# Patient Record
Sex: MALE | Race: BLACK OR AFRICAN AMERICAN | NOT HISPANIC OR LATINO | Employment: FULL TIME | URBAN - METROPOLITAN AREA
[De-identification: names, ages, dates, MRNs, and addresses within clinical notes are randomized per-mention and may not be internally consistent; named-entity substitution may affect disease eponyms.]

---

## 2017-01-10 ENCOUNTER — HOSPITAL ENCOUNTER (EMERGENCY)
Facility: HOSPITAL | Age: 52
Discharge: HOME | End: 2017-01-10
Payer: COMMERCIAL

## 2017-01-10 VITALS
HEART RATE: 98 BPM | WEIGHT: 213 LBS | HEIGHT: 77 IN | TEMPERATURE: 98 F | DIASTOLIC BLOOD PRESSURE: 83 MMHG | OXYGEN SATURATION: 99 % | RESPIRATION RATE: 18 BRPM | BODY MASS INDEX: 25.15 KG/M2 | SYSTOLIC BLOOD PRESSURE: 143 MMHG

## 2017-01-10 DIAGNOSIS — M54.50 CHRONIC RIGHT-SIDED LOW BACK PAIN WITHOUT SCIATICA: Primary | ICD-10-CM

## 2017-01-10 DIAGNOSIS — G89.29 CHRONIC RIGHT-SIDED LOW BACK PAIN WITHOUT SCIATICA: Primary | ICD-10-CM

## 2017-01-10 PROCEDURE — 99283 EMERGENCY DEPT VISIT LOW MDM: CPT

## 2017-01-10 RX ORDER — METHOCARBAMOL 750 MG/1
1500 TABLET, FILM COATED ORAL 3 TIMES DAILY
Qty: 15 TABLET | Refills: 0 | Status: SHIPPED | OUTPATIENT
Start: 2017-01-10 | End: 2017-01-15

## 2017-01-10 RX ORDER — DICLOFENAC SODIUM 25 MG/1
25 TABLET, DELAYED RELEASE ORAL 3 TIMES DAILY
Qty: 30 TABLET | Refills: 0 | Status: SHIPPED | OUTPATIENT
Start: 2017-01-10 | End: 2017-07-24

## 2017-01-10 NOTE — ED NOTES
"Pt reports having previous back injury while lifting weights. This episode was not from an injury, pt states, " sometimes it just gets irritated from bending over to tie my shoe."   "

## 2017-01-10 NOTE — ED PROVIDER NOTES
Encounter Date: 1/10/2017       History     Chief Complaint   Patient presents with    Back Pain     Hx of back pain. R lower back pain. Onset of episode yesterday. Worsening of pain since yesterday.      Review of patient's allergies indicates:  No Known Allergies  HPI Comments: Pt reports to ED c/o back pain. Pt reports pain is to right lower back and is similar to previous back pain.  Pt says he was helping his son put tires on a car yesterday and his back has been hurting him since then. Pt reports that pain initially radiated down his right leg, but now the pain is only in his back.  No alleviating factors. Pain worse with bending over.  Pt denies any paraesthesia, saddle anesthesia, CP, SOB, fever, nausea, vomiting, diarrhea, or any other associated sxs.     The history is provided by the patient.     Past Medical History   Diagnosis Date    Medical history non-contributory      No past medical history pertinent negatives.  Past Surgical History   Procedure Laterality Date    Knee surgery Left      Family History   Problem Relation Age of Onset    Hypertension       Social History   Substance Use Topics    Smoking status: Never Smoker    Smokeless tobacco: None    Alcohol use No     Review of Systems   Constitutional: Negative for fever.   HENT: Negative for sore throat.    Respiratory: Negative for shortness of breath.    Cardiovascular: Negative for chest pain.   Gastrointestinal: Negative for nausea.   Genitourinary: Negative for dysuria.   Musculoskeletal: Positive for back pain.   Skin: Negative for rash.   Neurological: Negative for weakness.   Hematological: Does not bruise/bleed easily.   All other systems reviewed and are negative.      Physical Exam   Initial Vitals   BP Pulse Resp Temp SpO2   01/10/17 1421 01/10/17 1421 01/10/17 1421 01/10/17 1421 01/10/17 1421   143/83 98 18 98 °F (36.7 °C) 99 %     Physical Exam    Nursing note and vitals reviewed.  Constitutional: He appears well-developed  and well-nourished.   HENT:   Head: Normocephalic and atraumatic.   Eyes: Conjunctivae and EOM are normal. Pupils are equal, round, and reactive to light.   Neck: Normal range of motion. Neck supple.   Cardiovascular: Normal rate and regular rhythm.   Pulmonary/Chest: Breath sounds normal.   Abdominal: Soft. Bowel sounds are normal.   Musculoskeletal: He exhibits tenderness (TTP right lower back.  Decreased ROM with bending forward d/t pain). He exhibits no edema.   Neurological: He is alert and oriented to person, place, and time. He has normal strength and normal reflexes. No cranial nerve deficit or sensory deficit.   Skin: No rash and no abscess noted. No erythema.         ED Course   Procedures  Labs Reviewed - No data to display                            ED Course     Clinical Impression:   The encounter diagnosis was Chronic right-sided low back pain without sciatica.          KOKI Acuna  01/10/17 1454

## 2017-01-10 NOTE — ED AVS SNAPSHOT
[facility]  [address] [address] [address]  [facility] [address]               Braulio Copeland   1/10/2017  2:24 PM   ED    Description:  Male : 1965   Department:  [facility]           Your Care was Coordinated By:     Provider Role From To    KOKI Acuna Physician Assistant 01/10/17 1423 --      Reason for Visit     Back Pain           Diagnoses this Visit        Comments    Chronic right-sided low back pain without sciatica    -  Primary       ED Disposition     None           To Do List           Follow-up Information     Follow up with Anam Andrews MD In 2 days.    Specialty:  Family Medicine    Why:  As needed, If symptoms worsen return to ED     Contact information:    [address] [place] [address]  [facility] [id number] [phone]         These Medications        Disp Refills Start End    methocarbamol (ROBAXIN) 750 MG Tab 15 tablet 0 1/10/2017 1/15/2017    Take 2 tablets (1,500 mg total) by mouth 3 (three) times daily. - Oral    diclofenac (VOLTAREN) 25 MG TbEC 30 tablet 0 1/10/2017     Take 1 tablet (25 mg total) by mouth 3 (three) times daily. - Oral      [facility]     [facility] -  Assistance  Registered nurses in the [facility] provide clinical advisement, health education, appointment booking, and other advisory services.  Call for this free service at [phone].             Medications           Message regarding Medications     Verify the changes and/or additions to your medication regime listed below are the same as discussed with your clinician today.  If any of these changes or additions are incorrect, please notify your healthcare provider.        START taking these NEW medications        Refills    methocarbamol (ROBAXIN) 750 MG Tab 0    Sig: Take 2 tablets (1,500 mg total) by mouth 3 (three) times daily.    Class: Print    Route: Oral    diclofenac (VOLTAREN) 25 MG TbEC 0    Sig: Take 1 tablet  "(25 mg total) by mouth 3 (three) times daily.    Class: Print    Route: Oral      STOP taking these medications     naproxen (NAPROSYN) 500 MG tablet Take 1 tablet (500 mg total) by mouth 2 (two) times daily with meals.    methylPREDNISolone (MEDROL DOSEPACK) 4 mg tablet Follow directions on pkg    hydrocodone-acetaminophen 10-325mg (NORCO)  mg Tab Take 1 tablet by mouth every 4 (four) hours as needed.    hydrocodone-acetaminophen 10-325mg (NORCO)  mg Tab Take 1 tablet by mouth every 6 (six) hours as needed.           Verify that the below list of medications is an accurate representation of the medications you are currently taking.  If none reported, the list may be blank. If incorrect, please contact your healthcare provider. Carry this list with you in case of emergency.           Current Medications     diclofenac (VOLTAREN) 25 MG TbEC Take 1 tablet (25 mg total) by mouth 3 (three) times daily.    methocarbamol (ROBAXIN) 750 MG Tab Take 2 tablets (1,500 mg total) by mouth 3 (three) times daily.           Clinical Reference Information           Your Vitals Were     BP Pulse Temp Resp Height Weight    143/83 (BP Location: Left arm, Patient Position: Sitting) 98 98 °F (36.7 °C) (Oral) 18 6' 5" (1.956 m) 96.6 kg (213 lb)    SpO2 BMI             99% 25.26 kg/m2         Allergies as of 1/10/2017     No Known Allergies      Immunizations Administered on Date of Encounter - 1/10/2017     None      ED Micro, Lab, POCT     None      ED Imaging Orders     None        Discharge Instructions         Back Care Tips    Caring for your back  These are things you can do to prevent a recurrence of acute back pain and to reduce symptoms from chronic back pain:  · Maintain a healthy weight. If you are overweight, losing weight will help most types of back pain.  · Exercise is an important part of recovery from most types of back pain. The muscles behind and in front of the spine support the back. This means " strengthening both the back muscles and the abdominal muscles will provide better support for your spine.   · Swimming and brisk walking are good overall exercises to improve your fitness level.  · Practice safe lifting methods (below).  · Practice good posture when sitting, standing and walking. Avoid prolonged sitting. This puts more stress on the lower back than standing or walking.  · Wear quality shoes with sufficient arch support. Foot and ankle alignment can affect back symptoms. Women should avoid wearing high heels.  · Therapeutic massage can help relax the back muscles without stretching them.  · During the first 24 to 72 hours after an acute injury or flare-up of chronic back pain, apply an ice pack to the painful area for 20 minutes and then remove it for 20 minutes, over a period of 60 to 90 minutes, or several times a day. As a safety precaution, do not use a heating pad at bedtime. Sleeping on a heating pad can lead to skin burns or tissue damage.  · You can alternate ice and heat therapies.  Medications  Talk to your healthcare provider before using medicines, especially if you have other medical problems or are taking other medicines.  · You may use acetaminophen or ibuprofen to control pain, unless your healthcare provider prescribed other pain medicine. If you have chronic conditions like diabetes, liver or kidney disease, stomach ulcers, or gastrointestinal bleeding, or are taking blood thinners, talk with your healthcare provider before taking any medicines.  · Be careful if you are given prescription pain medicines, narcotics, or medicine for muscle spasm. They can cause drowsiness, affect your coordination, reflexes, and judgment. Do not drive or operate heavy machinery while taking these types of medicines. Take prescription pain medicine only as prescribed by your healthcare provider.  Lumbar stretch  Here is a simple stretching exercise that will help relax muscle spasm and keep your back  more limber. If exercise makes your back pain worse, dont do it.  · Lie on your back with your knees bent and both feet on the ground.  · Slowly raise your left knee to your chest as you flatten your lower back against the floor. Hold for 5 seconds.  · Relax and repeat the exercise with your right knee.  · Do 10 of these exercises for each leg.  Safe lifting method  · Dont bend over at the waist to lift an object off the floor.  Instead, bend your knees and hips in a squat.   · Keep your back and head upright  · Hold the object close to your body, directly in front of you.  · Straighten your legs to lift the object.   · Lower the object to the floor in the reverse fashion.  · If you must slide something across the floor, push it.  Posture tips  Sitting  Sit in chairs with straight backs or low-back support. Keep your knees lower than your hips, with your feet flat on the floor.  When driving, sit up straight. Adjust the seat forward so you are not leaning toward the steering wheel.  A small pillow or rolled towel behind your lower back may help if you are driving long distances.   Standing  When standing for long periods, shift most of your weight to one leg at a time. Alternate legs every few minutes.   Sleeping  The best way to sleep is on your side with your knees bent. Put a low pillow under your head to support your neck in a neutral spine position. Avoid thick pillows that bend your neck to one side. Put a pillow between your legs to further relax your lower back. If you sleep on your back, put pillows under your knees to support your legs in a slightly flexed position. Use a firm mattress. If your mattress sags, replace it, or use a 1/2-inch plywood board under the mattress to add support.  Follow-up care  Follow up with your healthcare provider, or as advised.  If X-rays, a CT scan or an MRI scan were taken, they will be reviewed by a radiologist. You will be notified of any new findings that may affect  your care.  Call 911  Seek emergency medical care if any of the following occur:  · Trouble breathing  · Confusion  · Very drowsy  · Fainting or loss of consciousness  · Rapid or very slow heart rate  · Loss of  bowel or bladder control  When to seek medical care  Call your healthcare provider if any of the following occur:  · Pain becomes worse or spreads to your arms or legs  · Weakness or numbness in one or both arms or legs  · Numbness in the groin area  © 1558-0262 [employer] [address] [place] [address]. All rights reserved. This information is not intended as a substitute for professional medical care. Always follow your healthcare professional's instructions.          [facility] Sign-Up     Activating your [facility] account is as easy as 1-2-3!     1) Visit [email or web address], select Sign Up Now, enter this activation code and your date of birth, then select Next.  [email or web address]  Expires: 2/24/2017  2:49 PM      2) Create a username and password to use when you visit [facility] in the future and select a security question in case you lose your password and select Next.    3) Enter your e-mail address and click Sign Up!    Additional Information  If you have questions, please e-mail [email or web address] or call [phone] to talk to our [facility] staff. Remember, [facility] is NOT to be used for urgent needs. For medical emergencies, dial 911.          [facility] complies with applicable Federal civil rights laws and does not discriminate on the basis of race, color, national origin, age, disability, or sex.        Language Assistance Services     ATTENTION: Language assistance services are available, free of charge. Please call [phone].      ATENCIÓN: Si habla español, tiene a guo disposición servicios gratuitos de asistencia lingüística. Llame al [phone].     CHÚ Ý: N?u b?n nói Ti?ng Vi?t, có các d?ch v? h? tr? ngôn ng? mi?n phí dành cho b?n. G?i  s? [phone].

## 2017-01-10 NOTE — DISCHARGE INSTRUCTIONS
Back Care Tips    Caring for your back  These are things you can do to prevent a recurrence of acute back pain and to reduce symptoms from chronic back pain:  · Maintain a healthy weight. If you are overweight, losing weight will help most types of back pain.  · Exercise is an important part of recovery from most types of back pain. The muscles behind and in front of the spine support the back. This means strengthening both the back muscles and the abdominal muscles will provide better support for your spine.   · Swimming and brisk walking are good overall exercises to improve your fitness level.  · Practice safe lifting methods (below).  · Practice good posture when sitting, standing and walking. Avoid prolonged sitting. This puts more stress on the lower back than standing or walking.  · Wear quality shoes with sufficient arch support. Foot and ankle alignment can affect back symptoms. Women should avoid wearing high heels.  · Therapeutic massage can help relax the back muscles without stretching them.  · During the first 24 to 72 hours after an acute injury or flare-up of chronic back pain, apply an ice pack to the painful area for 20 minutes and then remove it for 20 minutes, over a period of 60 to 90 minutes, or several times a day. As a safety precaution, do not use a heating pad at bedtime. Sleeping on a heating pad can lead to skin burns or tissue damage.  · You can alternate ice and heat therapies.  Medications  Talk to your healthcare provider before using medicines, especially if you have other medical problems or are taking other medicines.  · You may use acetaminophen or ibuprofen to control pain, unless your healthcare provider prescribed other pain medicine. If you have chronic conditions like diabetes, liver or kidney disease, stomach ulcers, or gastrointestinal bleeding, or are taking blood thinners, talk with your healthcare provider before taking any medicines.  · Be careful if you are given  prescription pain medicines, narcotics, or medicine for muscle spasm. They can cause drowsiness, affect your coordination, reflexes, and judgment. Do not drive or operate heavy machinery while taking these types of medicines. Take prescription pain medicine only as prescribed by your healthcare provider.  Lumbar stretch  Here is a simple stretching exercise that will help relax muscle spasm and keep your back more limber. If exercise makes your back pain worse, dont do it.  · Lie on your back with your knees bent and both feet on the ground.  · Slowly raise your left knee to your chest as you flatten your lower back against the floor. Hold for 5 seconds.  · Relax and repeat the exercise with your right knee.  · Do 10 of these exercises for each leg.  Safe lifting method  · Dont bend over at the waist to lift an object off the floor.  Instead, bend your knees and hips in a squat.   · Keep your back and head upright  · Hold the object close to your body, directly in front of you.  · Straighten your legs to lift the object.   · Lower the object to the floor in the reverse fashion.  · If you must slide something across the floor, push it.  Posture tips  Sitting  Sit in chairs with straight backs or low-back support. Keep your knees lower than your hips, with your feet flat on the floor.  When driving, sit up straight. Adjust the seat forward so you are not leaning toward the steering wheel.  A small pillow or rolled towel behind your lower back may help if you are driving long distances.   Standing  When standing for long periods, shift most of your weight to one leg at a time. Alternate legs every few minutes.   Sleeping  The best way to sleep is on your side with your knees bent. Put a low pillow under your head to support your neck in a neutral spine position. Avoid thick pillows that bend your neck to one side. Put a pillow between your legs to further relax your lower back. If you sleep on your back, put pillows  under your knees to support your legs in a slightly flexed position. Use a firm mattress. If your mattress sags, replace it, or use a 1/2-inch plywood board under the mattress to add support.  Follow-up care  Follow up with your healthcare provider, or as advised.  If X-rays, a CT scan or an MRI scan were taken, they will be reviewed by a radiologist. You will be notified of any new findings that may affect your care.  Call 911  Seek emergency medical care if any of the following occur:  · Trouble breathing  · Confusion  · Very drowsy  · Fainting or loss of consciousness  · Rapid or very slow heart rate  · Loss of  bowel or bladder control  When to seek medical care  Call your healthcare provider if any of the following occur:  · Pain becomes worse or spreads to your arms or legs  · Weakness or numbness in one or both arms or legs  · Numbness in the groin area  © 7169-2465 [employer] [address] [place] [address], [place] [id number]. All rights reserved. This information is not intended as a substitute for professional medical care. Always follow your healthcare professional's instructions.

## 2017-07-24 ENCOUNTER — HOSPITAL ENCOUNTER (EMERGENCY)
Facility: HOSPITAL | Age: 52
Discharge: HOME | End: 2017-07-24
Attending: EMERGENCY MEDICINE
Payer: COMMERCIAL

## 2017-07-24 VITALS
TEMPERATURE: 98 F | SYSTOLIC BLOOD PRESSURE: 135 MMHG | HEIGHT: 76 IN | WEIGHT: 262 LBS | RESPIRATION RATE: 20 BRPM | DIASTOLIC BLOOD PRESSURE: 89 MMHG | HEART RATE: 85 BPM | OXYGEN SATURATION: 97 % | BODY MASS INDEX: 31.9 KG/M2

## 2017-07-24 DIAGNOSIS — S83.402A SPRAIN OF COLLATERAL LIGAMENT OF LEFT KNEE, INITIAL ENCOUNTER: Primary | ICD-10-CM

## 2017-07-24 DIAGNOSIS — M25.562 LEFT KNEE PAIN: ICD-10-CM

## 2017-07-24 PROCEDURE — 29505 APPLICATION LONG LEG SPLINT: CPT | Mod: LT

## 2017-07-24 PROCEDURE — 25000003 PHARM REV CODE 250: Performed by: NURSE PRACTITIONER

## 2017-07-24 PROCEDURE — 99283 EMERGENCY DEPT VISIT LOW MDM: CPT | Mod: 25

## 2017-07-24 RX ORDER — HYDROCODONE BITARTRATE AND ACETAMINOPHEN 10; 325 MG/1; MG/1
1 TABLET ORAL
Status: COMPLETED | OUTPATIENT
Start: 2017-07-24 | End: 2017-07-24

## 2017-07-24 RX ORDER — MELOXICAM 7.5 MG/1
7.5 TABLET ORAL 2 TIMES DAILY
Qty: 30 TABLET | Refills: 0 | Status: SHIPPED | OUTPATIENT
Start: 2017-07-24 | End: 2017-12-07 | Stop reason: ALTCHOICE

## 2017-07-24 RX ORDER — KETOROLAC TROMETHAMINE 10 MG/1
10 TABLET, FILM COATED ORAL
Status: COMPLETED | OUTPATIENT
Start: 2017-07-24 | End: 2017-07-24

## 2017-07-24 RX ORDER — HYDROCODONE BITARTRATE AND ACETAMINOPHEN 7.5; 325 MG/1; MG/1
1 TABLET ORAL EVERY 6 HOURS PRN
Qty: 15 TABLET | Refills: 0 | Status: SHIPPED | OUTPATIENT
Start: 2017-07-24 | End: 2017-12-07 | Stop reason: ALTCHOICE

## 2017-07-24 RX ADMIN — KETOROLAC TROMETHAMINE 10 MG: 10 TABLET, FILM COATED ORAL at 05:07

## 2017-07-24 RX ADMIN — HYDROCODONE BITARTRATE AND ACETAMINOPHEN 1 TABLET: 10; 325 TABLET ORAL at 06:07

## 2017-07-24 NOTE — ED PROVIDER NOTES
Encounter Date: 7/24/2017       History     Chief Complaint   Patient presents with    Knee Injury     c/o left knee pain and swelling after slipping in yard yesterday     The history is provided by the patient.   Leg Pain    The incident occurred in the yard. The injury mechanism was a fall (patient states that he hurt his left knee after slipping in his yard yesterday). The incident occurred yesterday. The pain is present in the left knee. The quality of the pain is described as aching. The pain is at a severity of 10/10. The pain has been constant since onset. Associated symptoms include inability to bear weight (secondary to pain). Pertinent negatives include no numbness, no loss of motion, no muscle weakness, no loss of sensation and no tingling. The symptoms are aggravated by activity, bearing weight and palpation. He has tried rest, ice and NSAIDs for the symptoms. The treatment provided no relief.       PCP:   Anam Andrews MD        Review of patient's allergies indicates:  No Known Allergies  Past Medical History:   Diagnosis Date    Medical history non-contributory      Past Surgical History:   Procedure Laterality Date    KNEE SURGERY Left      Family History   Problem Relation Age of Onset    Hypertension       Social History   Substance Use Topics    Smoking status: Never Smoker    Smokeless tobacco: Never Used    Alcohol use No     Review of Systems   Constitutional: Negative for chills and fever.   HENT: Negative for congestion and sore throat.    Respiratory: Negative for chest tightness and shortness of breath.    Cardiovascular: Negative for chest pain.   Gastrointestinal: Negative for abdominal pain, diarrhea, nausea and vomiting.   Genitourinary: Negative for dysuria.   Musculoskeletal: Negative for back pain and neck pain.        Positive for pain to left knee.    Skin: Negative for rash and wound.   Neurological: Negative for dizziness, tingling, weakness and numbness.    Hematological: Does not bruise/bleed easily.       Physical Exam     Initial Vitals [07/24/17 1616]   BP Pulse Resp Temp SpO2   (!) 157/90 88 18 97.5 °F (36.4 °C) 99 %      MAP       112.33         Physical Exam    Nursing note and vitals reviewed.  Constitutional: He appears well-developed and well-nourished. He is Obese . He is cooperative. He does not appear ill. No distress.   HENT:   Head: Normocephalic and atraumatic.   Nose: Nose normal.   Mouth/Throat: Uvula is midline, oropharynx is clear and moist and mucous membranes are normal.   Eyes: Conjunctivae, EOM and lids are normal. Pupils are equal, round, and reactive to light.   Neck: Trachea normal and normal range of motion. Neck supple.   Cardiovascular: Normal rate, regular rhythm, intact distal pulses and normal pulses.   Pulmonary/Chest: Effort normal. No respiratory distress.   Musculoskeletal: He exhibits no edema.        Right knee: He exhibits decreased range of motion (secondary to pain). He exhibits no swelling, no effusion, no deformity and no laceration. Tenderness found. LCL tenderness noted.   Neurological: He is alert and oriented to person, place, and time. He has normal strength. GCS eye subscore is 4. GCS verbal subscore is 5. GCS motor subscore is 6.   Neurovascular intact to all extremities.    Skin: Skin is warm, dry and intact. Capillary refill takes less than 2 seconds. No rash noted.   Psychiatric: He has a normal mood and affect. His speech is normal and behavior is normal. Thought content normal.         ED Course   Splint Application  Date/Time: 7/24/2017 5:45 PM  Performed by: JD SHANKS  Authorized by: JD SHANKS   Consent Done: Yes  Consent: Verbal consent obtained.  Risks and benefits: risks, benefits and alternatives were discussed  Consent given by: patient  Patient understanding: patient states understanding of the procedure being performed  Site marked: the operative site was marked  Imaging studies:  "imaging studies available  Patient identity confirmed: , MRN, name and verbally with patient  Time out: Immediately prior to procedure a "time out" was called to verify the correct patient, procedure, equipment, support staff and site/side marked as required.  Location details: left knee  Splint type: knee immobilizer.  Supplies used: knee immobilizer.  Post-procedure: The splinted body part was neurovascularly unchanged following the procedure.  Patient tolerance: Patient tolerated the procedure well with no immediate complications  Comments: Patient provided crutches.           ED ONGOING VITALS:  Vitals:    17 1616 17 1759   BP: (!) 157/90 135/89   Pulse: 88 85   Resp: 18 20   Temp: 97.5 °F (36.4 °C)    TempSrc: Oral    SpO2: 99% 97%   Weight: 118.8 kg (262 lb)    Height: 6' 4" (1.93 m)          RADIOLOGY STUDIES:  Imaging Results          X-Ray Knee Complete 4 or More Views Left (Final result)  Result time 17 17:42:30    Final result by Alan Brandon MD (17 17:42:30)                 Impression:           1.  Negative for acute process involving the left knee.  2.  Stable moderate-to-severe tricompartment degenerative changes along with numerous intra-articular loose bodies.      Electronically signed by: ALAN BRANDON MD  Date:     17  Time:    17:42              Narrative:    Left knee x-ray, 4 views :    Clinical Indication: Pain in left knee.     Findings: Comparison to made to 2016. 4 views of the left knee were submitted for interpretation.  Negative for knee joint effusion.     Alignment is satisfactory. No   fractures, dislocations, or erosive arthritic change.  Negative for radiopaque foreign bodies or air in the soft tissues. Moderate to severe tricompartment degenerative changes again seen.  Intra-articular loose bodies, involving both the medial and lateral to vertebral compartments and the suprapatellar recess again seen.                                The " above vital signs and test results have been reviewed by the emergency provider.       ED MEDICATIONS:  Medications   ketorolac tablet 10 mg (10 mg Oral Given 7/24/17 1715)   hydrocodone-acetaminophen 10-325mg per tablet 1 tablet (1 tablet Oral Given 7/24/17 1808)           ED EVENTS:      1755 HOURS RE-EVALUATION & DISPOSITION:   Reassessment at the time of disposition demonstrates that the patient is resting comfortably in no acute distress.  He has remained hemodynamically stable throughout the entire ED visit and is without objective evidence for acute process requiring urgent intervention or hospitalization. I discussed test results and provided counseling to patient with regard to condition, the treatment plan, specific conditions for return, and the importance of follow up.  Answered questions at this time. The patient is stable for discharge.                   Medical Decision Making:   History:   Old Records Summarized: records from clinic visits.  Clinical Tests:   Radiological Study: Ordered and Reviewed                     Clinical Impression:       ICD-10-CM ICD-9-CM   1. Sprain of collateral ligament of left knee, initial encounter S83.402A 844.1   2. Left knee pain M25.562 719.46         Disposition:   Disposition: Discharged  Condition: Stable  I discussed with patient that the evaluation in the emergency department does not suggest any emergent or life threatening medical condition requiring immediate intervention beyond what was provided in the ED, and I believe patient is safe for discharge.  Regardless, an unremarkable evaluation in the ED does not preclude the development or presence of a serious of life threatening condition. As such, patient was instructed to return immediately for any worsening or change in current symptoms. I also discussed the results of my evaluation and diagnostics with patient and he concurs with the evaluation and management plan.  Detailed written and verbal  instructions provided to patient and he expressed a verbal understanding of the discharge instructions and overall management plan. Reiterated the importance of medication administration and safety and advised patient to follow up with primary care provider in 3-5 days or sooner if needed.  Also advised patient to return to the ER for any complications.     Regarding KNEE PAIN: Patient instructed to follow prescribed therapy.  I encouraged patient to get plenty of rest, work to obtain/maintain healthy weight and BMI, exercise to improve muscle strength and motion to joint area, protect joint from further injury, and avoid overexerting extremity - especially when stiffness or pain is present. Advised patient to follow up with primary care provider in one week if symptoms are still present or condition worsens.          Discharge Medication List as of 7/24/2017  5:53 PM      START taking these medications    Details   hydrocodone-acetaminophen 7.5-325mg (NORCO) 7.5-325 mg per tablet Take 1 tablet by mouth every 6 (six) hours as needed for Pain., Starting Mon 7/24/2017, Print      meloxicam (MOBIC) 7.5 MG tablet Take 1 tablet (7.5 mg total) by mouth 2 (two) times daily., Starting Mon 7/24/2017, Print             Follow-up Information     Call  Anam Andrews MD.    Specialty:  Family Medicine  Why:  As needed or if symptoms worsen  Contact information:  [address] [place] [id number]  [phone]             Call  Orthopedic Specialist.    Why:  As needed                                Jarod Ramos NP  08/05/17 0383

## 2017-07-24 NOTE — ED NOTES
Pt stable, in NAD, and states no further needs at this time. NP aware of vitals ok to d/c. Pt to be d/c'd home.

## 2017-07-24 NOTE — ED NOTES
LOC: The patient is awake, alert and aware of environment with an appropriate affect, the patient is oriented x 3 and speaking appropriately.  APPEARANCE: Patient resting comfortably and in no acute distress, patient is clean and well groomed, patient's clothing is properly fastened.  HEENT: Brief WNL  SKIN: Brief WNL.   MUSCULOSKELETAL: pain and swelling to left knee  RESPIRATORY: Brief WNL  CARDIAC: Brief WNL  GASTRO: Brief WNL  : Brief WNL  Peripheral Vasc: Brief WNL  NEURO: Brief WNL  PSYCH: Brief WNL

## 2017-12-07 ENCOUNTER — HOSPITAL ENCOUNTER (EMERGENCY)
Facility: HOSPITAL | Age: 52
Discharge: HOME | End: 2017-12-07
Attending: EMERGENCY MEDICINE
Payer: COMMERCIAL

## 2017-12-07 VITALS
DIASTOLIC BLOOD PRESSURE: 87 MMHG | WEIGHT: 264.38 LBS | TEMPERATURE: 98 F | RESPIRATION RATE: 18 BRPM | SYSTOLIC BLOOD PRESSURE: 134 MMHG | HEART RATE: 86 BPM | OXYGEN SATURATION: 99 % | HEIGHT: 76 IN | BODY MASS INDEX: 32.19 KG/M2

## 2017-12-07 DIAGNOSIS — S39.012A BACK STRAIN, INITIAL ENCOUNTER: Primary | ICD-10-CM

## 2017-12-07 DIAGNOSIS — M62.838 MUSCLE SPASM: ICD-10-CM

## 2017-12-07 PROCEDURE — 63600175 PHARM REV CODE 636 W HCPCS: Performed by: EMERGENCY MEDICINE

## 2017-12-07 PROCEDURE — 96372 THER/PROPH/DIAG INJ SC/IM: CPT

## 2017-12-07 PROCEDURE — 99283 EMERGENCY DEPT VISIT LOW MDM: CPT | Mod: 25

## 2017-12-07 RX ORDER — HYDROCODONE BITARTRATE AND ACETAMINOPHEN 5; 325 MG/1; MG/1
1 TABLET ORAL EVERY 4 HOURS PRN
Qty: 11 TABLET | Refills: 0 | Status: SHIPPED | OUTPATIENT
Start: 2017-12-07 | End: 2017-12-17

## 2017-12-07 RX ORDER — NAPROXEN 375 MG/1
375 TABLET ORAL 2 TIMES DAILY WITH MEALS
Qty: 30 TABLET | Refills: 0 | Status: SHIPPED | OUTPATIENT
Start: 2017-12-07 | End: 2018-06-22

## 2017-12-07 RX ORDER — ORPHENADRINE CITRATE 30 MG/ML
60 INJECTION INTRAMUSCULAR; INTRAVENOUS
Status: COMPLETED | OUTPATIENT
Start: 2017-12-07 | End: 2017-12-07

## 2017-12-07 RX ORDER — CYCLOBENZAPRINE HCL 10 MG
10 TABLET ORAL 3 TIMES DAILY PRN
Qty: 15 TABLET | Refills: 0 | Status: SHIPPED | OUTPATIENT
Start: 2017-12-07 | End: 2017-12-07

## 2017-12-07 RX ORDER — CYCLOBENZAPRINE HCL 10 MG
10 TABLET ORAL 3 TIMES DAILY PRN
Qty: 15 TABLET | Refills: 0 | Status: SHIPPED | OUTPATIENT
Start: 2017-12-07 | End: 2017-12-12

## 2017-12-07 RX ORDER — KETOROLAC TROMETHAMINE 30 MG/ML
60 INJECTION, SOLUTION INTRAMUSCULAR; INTRAVENOUS
Status: COMPLETED | OUTPATIENT
Start: 2017-12-07 | End: 2017-12-07

## 2017-12-07 RX ADMIN — ORPHENADRINE CITRATE 60 MG: 30 INJECTION INTRAMUSCULAR; INTRAVENOUS at 10:12

## 2017-12-07 RX ADMIN — KETOROLAC TROMETHAMINE 60 MG: 30 INJECTION, SOLUTION INTRAMUSCULAR at 10:12

## 2017-12-07 NOTE — ED PROVIDER NOTES
Encounter Date: 12/7/2017       History     Chief Complaint   Patient presents with    Back Pain     right side back pain since yesterday, denies injury, states same as previous back pain, denies numbness or tingling     The history is provided by the patient.   Back Pain    This is a recurrent problem. The current episode started yesterday. The problem occurs constantly. The problem has been unchanged. The pain is associated with lifting heavy objects. The pain is present in the lumbar spine. The quality of the pain is described as stabbing. The pain radiates to the left leg. The symptoms are aggravated by bending, twisting and certain positions. Pertinent negatives include no chest pain, no fever, no numbness, no dysuria, no paresthesias, no paresis, no tingling and no weakness. He has tried nothing for the symptoms.     Review of patient's allergies indicates:  No Known Allergies  Past Medical History:   Diagnosis Date    Back pain     Medical history non-contributory      Past Surgical History:   Procedure Laterality Date    KNEE SURGERY Left      Family History   Problem Relation Age of Onset    Hypertension       Social History   Substance Use Topics    Smoking status: Never Smoker    Smokeless tobacco: Never Used    Alcohol use No     Review of Systems   Constitutional: Negative for fever.   HENT: Negative for sore throat.    Respiratory: Negative for shortness of breath.    Cardiovascular: Negative for chest pain.   Gastrointestinal: Negative for nausea.   Genitourinary: Negative for dysuria.   Musculoskeletal: Positive for back pain.   Skin: Negative for rash.   Neurological: Negative for tingling, weakness, numbness and paresthesias.   Hematological: Does not bruise/bleed easily.       Physical Exam     Initial Vitals [12/07/17 1016]   BP Pulse Resp Temp SpO2   126/82 74 18 97.9 °F (36.6 °C) 99 %      MAP       96.67         Physical Exam    Nursing note and vitals reviewed.  Constitutional: He  appears well-developed and well-nourished. No distress.   HENT:   Head: Normocephalic and atraumatic.   Mouth/Throat: Oropharynx is clear and moist.   Eyes: Conjunctivae and EOM are normal. Pupils are equal, round, and reactive to light.   Neck: Normal range of motion. Neck supple.   Cardiovascular: Normal rate, regular rhythm and normal heart sounds. Exam reveals no gallop and no friction rub.    No murmur heard.  Pulmonary/Chest: Breath sounds normal. No respiratory distress. He has no wheezes. He has no rhonchi. He has no rales.   Abdominal: Soft. Bowel sounds are normal. He exhibits no distension and no mass. There is no tenderness. There is no rebound and no guarding.   Musculoskeletal: Normal range of motion. He exhibits no edema.        Lumbar back: He exhibits spasm (Right paraspinal). He exhibits no bony tenderness.   Neurological: He is alert and oriented to person, place, and time. He has normal strength.   Skin: Skin is warm and dry. No rash noted.   Psychiatric: He has a normal mood and affect. Thought content normal.         ED Course   Procedures  Labs Reviewed - No data to display                            ED Course      Clinical Impression:       ICD-10-CM ICD-9-CM   1. Back strain, initial encounter S39.012A 847.9   2. Muscle spasm M62.838 728.85         Disposition:   Disposition: Discharged  Condition: Stable                        Omar Garcia MD  12/07/17 1026

## 2018-06-22 ENCOUNTER — HOSPITAL ENCOUNTER (EMERGENCY)
Facility: HOSPITAL | Age: 53
Discharge: HOME | End: 2018-06-22
Attending: EMERGENCY MEDICINE
Payer: COMMERCIAL

## 2018-06-22 VITALS
OXYGEN SATURATION: 98 % | DIASTOLIC BLOOD PRESSURE: 83 MMHG | TEMPERATURE: 98 F | BODY MASS INDEX: 31.1 KG/M2 | WEIGHT: 263.44 LBS | HEART RATE: 70 BPM | HEIGHT: 77 IN | RESPIRATION RATE: 18 BRPM | SYSTOLIC BLOOD PRESSURE: 136 MMHG

## 2018-06-22 DIAGNOSIS — S39.012A LOW BACK STRAIN, INITIAL ENCOUNTER: ICD-10-CM

## 2018-06-22 DIAGNOSIS — M54.41 ACUTE RIGHT-SIDED LOW BACK PAIN WITH RIGHT-SIDED SCIATICA: Primary | ICD-10-CM

## 2018-06-22 PROCEDURE — 99283 EMERGENCY DEPT VISIT LOW MDM: CPT | Mod: 25

## 2018-06-22 PROCEDURE — 63600175 PHARM REV CODE 636 W HCPCS: Performed by: NURSE PRACTITIONER

## 2018-06-22 PROCEDURE — 96372 THER/PROPH/DIAG INJ SC/IM: CPT

## 2018-06-22 RX ORDER — NABUMETONE 500 MG/1
500 TABLET, FILM COATED ORAL 2 TIMES DAILY PRN
Qty: 30 TABLET | Refills: 0 | Status: SHIPPED | OUTPATIENT
Start: 2018-06-22 | End: 2019-12-27

## 2018-06-22 RX ORDER — KETOROLAC TROMETHAMINE 30 MG/ML
60 INJECTION, SOLUTION INTRAMUSCULAR; INTRAVENOUS
Status: COMPLETED | OUTPATIENT
Start: 2018-06-22 | End: 2018-06-22

## 2018-06-22 RX ORDER — PREDNISONE 20 MG/1
TABLET ORAL
Qty: 18 TABLET | Refills: 0 | Status: SHIPPED | OUTPATIENT
Start: 2018-06-22 | End: 2019-12-27

## 2018-06-22 RX ORDER — ORPHENADRINE CITRATE 30 MG/ML
60 INJECTION INTRAMUSCULAR; INTRAVENOUS
Status: COMPLETED | OUTPATIENT
Start: 2018-06-22 | End: 2018-06-22

## 2018-06-22 RX ORDER — CYCLOBENZAPRINE HCL 10 MG
10 TABLET ORAL 3 TIMES DAILY PRN
Qty: 30 TABLET | Refills: 0 | Status: SHIPPED | OUTPATIENT
Start: 2018-06-22 | End: 2018-06-27

## 2018-06-22 RX ORDER — HYDROCODONE BITARTRATE AND ACETAMINOPHEN 7.5; 325 MG/1; MG/1
1 TABLET ORAL EVERY 6 HOURS PRN
Qty: 15 TABLET | Refills: 0 | Status: SHIPPED | OUTPATIENT
Start: 2018-06-22 | End: 2019-12-27

## 2018-06-22 RX ADMIN — ORPHENADRINE CITRATE 60 MG: 30 INJECTION INTRAMUSCULAR; INTRAVENOUS at 05:06

## 2018-06-22 RX ADMIN — KETOROLAC TROMETHAMINE 60 MG: 30 INJECTION, SOLUTION INTRAMUSCULAR at 05:06

## 2018-06-22 NOTE — ED PROVIDER NOTES
Encounter Date: 6/22/2018       History     Chief Complaint   Patient presents with    Back Pain     Reports lower back pain since this morning while getting up for work. Radiates down R buttocks       The history is provided by the patient.   Back Pain    This is a recurrent problem. The current episode started yesterday. The problem has been gradually worsening. The pain is associated with no known injury. The pain is present in the lumbar spine (right-sided). The quality of the pain is described as shooting and stabbing. Radiates to: right buttock. The pain is at a severity of 9/10. The symptoms are aggravated by certain positions, twisting and bending. The pain is the same all the time. Stiffness is present in the morning. Associated symptoms include numbness (right buttock) and tingling (to right buttock). Pertinent negatives include no chest pain, no fever, no weight loss, no headaches, no abdominal pain, no abdominal swelling, no perianal numbness, no bladder incontinence, no dysuria and no weakness. He has tried NSAIDs for the symptoms. The treatment provided no relief.         PCP:   Anam Andrews MD        Review of patient's allergies indicates:  No Known Allergies  Past Medical History:   Diagnosis Date    Back pain      Past Surgical History:   Procedure Laterality Date    KNEE SURGERY Left      Family History   Problem Relation Age of Onset    Hypertension Unknown      Social History   Substance Use Topics    Smoking status: Never Smoker    Smokeless tobacco: Never Used    Alcohol use No     Review of Systems   Constitutional: Negative for chills, fever and weight loss.   HENT: Negative for congestion and sore throat.    Respiratory: Negative for cough, chest tightness, shortness of breath and wheezing.    Cardiovascular: Negative for chest pain and palpitations.   Gastrointestinal: Negative for abdominal pain, diarrhea, nausea and vomiting.   Genitourinary: Negative for bladder incontinence  and dysuria.   Musculoskeletal: Positive for back pain (right lower back). Negative for neck pain.   Skin: Negative for rash.   Neurological: Positive for tingling (to right buttock) and numbness (right buttock). Negative for weakness and headaches.   Hematological: Does not bruise/bleed easily.   Psychiatric/Behavioral: Negative for confusion.       Physical Exam     Initial Vitals [06/22/18 1656]   BP Pulse Resp Temp SpO2   139/84 69 20 97.9 °F (36.6 °C) 99 %      MAP       --         Physical Exam    Nursing note and vitals reviewed.  Constitutional: Vital signs are normal. He appears well-developed and well-nourished. He is cooperative. He does not appear ill. No distress.   HENT:   Head: Normocephalic and atraumatic.   Right Ear: Hearing and external ear normal.   Left Ear: Hearing and external ear normal.   Nose: Nose normal.   Mouth/Throat: Uvula is midline, oropharynx is clear and moist and mucous membranes are normal.   Eyes: Conjunctivae, EOM and lids are normal. Pupils are equal, round, and reactive to light.   Neck: Trachea normal and normal range of motion. Neck supple.   Cardiovascular: Normal rate, regular rhythm, intact distal pulses and normal pulses.   Pulmonary/Chest: Effort normal. No accessory muscle usage. No respiratory distress.   Abdominal: Normal appearance. He exhibits no distension and no mass. There is no tenderness. There is no rebound and no guarding.   Musculoskeletal: He exhibits no edema.        Lumbar back: He exhibits decreased range of motion (secondary to pain) and tenderness (reproducible tenderness noted to right lower lumbar region - no bony tenderness or deformity noted - patient has subjective complaints of sciatic neuralgia to his right buttock - neurovascular intact). He exhibits no bony tenderness, no swelling, no edema and no deformity.        Back:    Neurological: He is alert and oriented to person, place, and time. He has normal strength. Gait normal. GCS eye  "subscore is 4. GCS verbal subscore is 5. GCS motor subscore is 6.   Neurovascular intact to all extremities. Patient has subjective complaints of sciatic neuralgia to right buttock.   Skin: Skin is warm, dry and intact. Capillary refill takes less than 2 seconds. No rash noted.   Psychiatric: He has a normal mood and affect. His speech is normal and behavior is normal. Cognition and memory are normal.         ED Course   Procedures        ED Medications:   Medications   orphenadrine injection 60 mg (60 mg Intramuscular Given 6/22/18 1735)   ketorolac injection 60 mg (60 mg Intramuscular Given 6/22/18 1735)         ED Course Vitals  Vitals:    06/22/18 1656   BP: 139/84   BP Location: Left arm   Patient Position: Sitting   Pulse: 69   Resp: 20   Temp: 97.9 °F (36.6 °C)   TempSrc: Oral   SpO2: 99%   Weight: 119.5 kg (263 lb 7.2 oz)   Height: 6' 5" (1.956 m)       1755 HOURS RE-EVALUATION & DISPOSITION:   Reassessment at the time of disposition demonstrates that the patient is resting comfortably in no acute distress.  Mr. Copeland states that he is starting to feel somewhat better. He rates his pain as a 6/10 upon discharge.  He has remained hemodynamically stable throughout the entire ED visit and is without objective evidence for acute process requiring urgent intervention or hospitalization. I provided counseling to patient with regard to condition, the treatment plan, specific conditions for return, and the importance of follow up. Patient instructed to refrain from driving or operating heavy machinery after taking pain medications or muscle relaxants. Answered questions at this time. The patient is stable for discharge.            Medical Decision Making:   History:   Old Records Summarized: records from clinic visits.                      Clinical Impression:       ICD-10-CM ICD-9-CM   1. Acute right-sided low back pain with right-sided sciatica M54.41 724.2     724.3   2. Low back strain, initial encounter S39.012A " 847.2         Disposition:   Disposition: Discharged  Condition: Stable  I discussed with patient that the evaluation in the emergency department does not suggest any emergent or life threatening medical condition requiring immediate intervention beyond what was provided in the ED, and I believe patient is safe for discharge.  Regardless, an unremarkable evaluation in the ED does not preclude the development or presence of a serious of life threatening condition. As such, patient was instructed to return immediately for any worsening or change in current symptoms. I also discussed the results of my evaluation and diagnostics with patient and he concurs with the evaluation and management plan.  Detailed written and verbal instructions provided to patient and he expressed a verbal understanding of the discharge instructions and overall management plan. Reiterated the importance of medication administration and safety and advised patient to follow up with primary care provider in 3-5 days or sooner if needed.  Also advised patient to return to the ER for any complications.     Regarding BACK PAIN, I advised patient to rest and slowly start to increase activity as pain decreases or as tolerable.  Also recommend the use of ice and heat. Explained to patient that ice will help decrease swelling and pain and prevent tissue damage (verbalized importance of covering ice with a towel and not applying it directly to skin and applying it for 20-30 minutes every 2 hours as needed). Further explained that heat will help decrease pain and muscle spasms (instructed patient to not fall asleep with heating pad and to apply heat to lower back for 20-30 minutes every 2 hours as needed). For prevention, I recommended that the patient use correct body movements (bend at the hips and knees when picking up objects and use leg muscles as you lift the load; keep objects close to chest when lifting it; and avoid twisting or lifting anything  above the waist; change position often when standing for long periods of time; never reach, pull, or push while seated; exercise frequently and warm up before exercising; and maintain a healthy weight.  Follow up with primary care provider if no improvement is noticed in next three days.  Take all medications as prescribed and avoid operating heavy machinery or driving if prescribed pain medications or muscle relaxants.  Instructed patient to return to the emergency department if they develop incontinence, notice increased swelling or pain in lower back; begin to have difficulty moving lower extremities; or develop numbness to legs.     Regarding SCIATIC PAIN, the patient has been counseled regarding a diagnosis of sciatica, including the possible sources for sciatic nerve pathology as well as the expectations for improvement.  There have been no focal neurologic findings at present.  Advised patient to take all medications as prescribed, follow up with primary care provider, and participate in activity as tolerated. The patient has been instructed to return to the ER immediately with any worsening symptoms including development of numbness, weakness, or incontinence.             New Prescriptions    CYCLOBENZAPRINE (FLEXERIL) 10 MG TABLET    Take 1 tablet (10 mg total) by mouth 3 (three) times daily as needed.    HYDROCODONE-ACETAMINOPHEN (NORCO) 7.5-325 MG PER TABLET    Take 1 tablet by mouth every 6 (six) hours as needed for Pain.    NABUMETONE (RELAFEN) 500 MG TABLET    Take 1 tablet (500 mg total) by mouth 2 (two) times daily as needed for Pain.    PREDNISONE (DELTASONE) 20 MG TABLET    Take 1 tablet 3 times per day for 3 days, then  Take 1 tablet 2 times per day for 3 days, then   Take 1 tablet daily for 3 days       Follow-up Information     Schedule an appointment as soon as possible for a visit  with Anam Andrews MD.    Specialty:  Family Medicine  Contact information:  [address] [place] [address]  [id number]  [phone]                                Jarod Ramos, NP  06/22/18 1802

## 2019-12-27 ENCOUNTER — HOSPITAL ENCOUNTER (EMERGENCY)
Facility: HOSPITAL | Age: 54
Discharge: HOME | End: 2019-12-27
Attending: EMERGENCY MEDICINE
Payer: COMMERCIAL

## 2019-12-27 VITALS
SYSTOLIC BLOOD PRESSURE: 136 MMHG | OXYGEN SATURATION: 98 % | DIASTOLIC BLOOD PRESSURE: 82 MMHG | WEIGHT: 262 LBS | TEMPERATURE: 98 F | HEART RATE: 68 BPM | RESPIRATION RATE: 17 BRPM | BODY MASS INDEX: 31.9 KG/M2 | HEIGHT: 76 IN

## 2019-12-27 DIAGNOSIS — S39.012A STRAIN OF LUMBAR REGION, INITIAL ENCOUNTER: Primary | ICD-10-CM

## 2019-12-27 PROCEDURE — 63600175 PHARM REV CODE 636 W HCPCS: Mod: ER | Performed by: EMERGENCY MEDICINE

## 2019-12-27 PROCEDURE — 99284 EMERGENCY DEPT VISIT MOD MDM: CPT | Mod: 25,ER

## 2019-12-27 PROCEDURE — 96372 THER/PROPH/DIAG INJ SC/IM: CPT | Mod: ER

## 2019-12-27 RX ORDER — CYCLOBENZAPRINE HCL 10 MG
10 TABLET ORAL 3 TIMES DAILY PRN
Qty: 15 TABLET | Refills: 0 | Status: SHIPPED | OUTPATIENT
Start: 2019-12-27 | End: 2020-01-01

## 2019-12-27 RX ORDER — ORPHENADRINE CITRATE 30 MG/ML
60 INJECTION INTRAMUSCULAR; INTRAVENOUS
Status: COMPLETED | OUTPATIENT
Start: 2019-12-27 | End: 2019-12-27

## 2019-12-27 RX ORDER — KETOROLAC TROMETHAMINE 30 MG/ML
10 INJECTION, SOLUTION INTRAMUSCULAR; INTRAVENOUS
Status: COMPLETED | OUTPATIENT
Start: 2019-12-27 | End: 2019-12-27

## 2019-12-27 RX ORDER — MELOXICAM 7.5 MG/1
7.5 TABLET ORAL DAILY
Qty: 7 TABLET | Refills: 0 | Status: SHIPPED | OUTPATIENT
Start: 2019-12-27 | End: 2020-01-03

## 2019-12-27 RX ADMIN — KETOROLAC TROMETHAMINE 10 MG: 30 INJECTION, SOLUTION INTRAMUSCULAR at 10:12

## 2019-12-27 RX ADMIN — ORPHENADRINE CITRATE 60 MG: 30 INJECTION INTRAMUSCULAR; INTRAVENOUS at 10:12

## 2019-12-28 NOTE — ED NOTES
"Pt reports h/o back pain- but states "worse since yesterday." Has been treated in this ER before for acute onset of back pain, but has never had MRI or diagnosis per spine doctor.  Pt appears to be in pain. Slumped over with slow mobility.  "

## 2019-12-28 NOTE — ED PROVIDER NOTES
"Encounter Date: 12/27/2019       History     Chief Complaint   Patient presents with    Back Pain     when bending down yesterday, felt his back "pull". Reports severe lower back pain since.     Patient reports a chief complaint of back pain.  This is isolated to the lower back.  This pain does not radiate down the lateral thigh.  There has been no apparent trauma though today's pain began while he was bending to lift something yesterday.  This has occurred intermittently over the past few years.  There is no numbness, weakness, incontinence reported.  Patient has not attempted treatment at home with over-the-counter medications.  Urinary complaints are denied.         Review of patient's allergies indicates:  No Known Allergies  Past Medical History:   Diagnosis Date    Back pain      Past Surgical History:   Procedure Laterality Date    KNEE SURGERY Left      Family History   Problem Relation Age of Onset    Hypertension Unknown      Social History     Tobacco Use    Smoking status: Never Smoker    Smokeless tobacco: Never Used   Substance Use Topics    Alcohol use: No    Drug use: No     Review of Systems   Constitutional: Negative for chills and fever.   HENT: Negative for congestion.    Respiratory: Negative for chest tightness and shortness of breath.    Cardiovascular: Negative for chest pain and leg swelling.   Gastrointestinal: Negative for abdominal pain, constipation, diarrhea, nausea and vomiting.   Genitourinary: Negative for dysuria, frequency and urgency.   Musculoskeletal: Positive for back pain. Negative for neck pain.   Skin: Negative for color change and rash.   Allergic/Immunologic: Negative for immunocompromised state.   Neurological: Negative for weakness and numbness.   Hematological: Negative for adenopathy. Does not bruise/bleed easily.   All other systems reviewed and are negative.    Physical Exam     Initial Vitals [12/27/19 2157]   BP Pulse Resp Temp SpO2   138/83 84 18 98 °F " (36.7 °C) 99 %      MAP       --         Physical Exam    Nursing note and vitals reviewed.  Constitutional: He appears well-developed and well-nourished. He is not diaphoretic. No distress.   HENT:   Head: Normocephalic and atraumatic.   Right Ear: External ear normal.   Left Ear: External ear normal.   Nose: Nose normal.   Mouth/Throat: Oropharynx is clear and moist.   Eyes: Conjunctivae and EOM are normal. Pupils are equal, round, and reactive to light. No scleral icterus.   Neck: Neck supple. No JVD present.   Cardiovascular: Normal rate, regular rhythm, normal heart sounds and intact distal pulses. Exam reveals no gallop and no friction rub.    No murmur heard.  Pulmonary/Chest: Breath sounds normal. No respiratory distress. He has no rales.   Abdominal: Soft. There is no tenderness.   Musculoskeletal: Normal range of motion. He exhibits no edema.        Lumbar back: He exhibits tenderness and pain. He exhibits normal range of motion, no bony tenderness, no deformity, no spasm and normal pulse.   Neurological: He is alert and oriented to person, place, and time. He has normal strength.   Skin: Skin is warm and dry. No rash noted.   Psychiatric: He has a normal mood and affect. His behavior is normal.         ED Course   Procedures  Labs Reviewed - No data to display       Imaging Results    None          Medical Decision Making:   ED Management:  All findings were reviewed with the patient/family in detail.  I see no indication of an emergent process beyond that addressed during our encounter but have duly counseled the patient/family regarding the need for prompt follow-up as well as the indications that should prompt immediate return to the emergency room should new or worrisome developments occur.  The patient has additionally been provided with printed information regarding diagnosis as well as instructions regarding follow up and any medications intended to manage the patient's aforementioned conditions.   The patient/family communicates understanding of all this information and all remaining questions and concerns were addressed at this time.                                           Clinical Impression:       ICD-10-CM ICD-9-CM   1. Strain of lumbar region, initial encounter S39.012A 847.2                             Patel Mejia MD  12/28/19 0355

## 2019-12-28 NOTE — ED NOTES
Tolerated injections well without complaints or adverse reactions.  Prescriptions, pain relief measures, and all DC instructions given and explained to pt. Voiced understanding. DC to home.

## 2020-09-03 ENCOUNTER — HOSPITAL ENCOUNTER (EMERGENCY)
Facility: HOSPITAL | Age: 55
Discharge: HOME | End: 2020-09-03
Attending: EMERGENCY MEDICINE
Payer: COMMERCIAL

## 2020-09-03 VITALS
SYSTOLIC BLOOD PRESSURE: 145 MMHG | DIASTOLIC BLOOD PRESSURE: 83 MMHG | RESPIRATION RATE: 20 BRPM | TEMPERATURE: 98 F | WEIGHT: 262.38 LBS | BODY MASS INDEX: 31.93 KG/M2 | HEART RATE: 77 BPM | OXYGEN SATURATION: 95 %

## 2020-09-03 DIAGNOSIS — R06.02 SOB (SHORTNESS OF BREATH): ICD-10-CM

## 2020-09-03 DIAGNOSIS — K21.9 GASTROESOPHAGEAL REFLUX DISEASE, ESOPHAGITIS PRESENCE NOT SPECIFIED: Primary | ICD-10-CM

## 2020-09-03 LAB
ALBUMIN SERPL BCP-MCNC: 3.7 G/DL (ref 3.5–5.2)
ALP SERPL-CCNC: 86 U/L (ref 55–135)
ALT SERPL W/O P-5'-P-CCNC: 35 U/L (ref 10–44)
ANION GAP SERPL CALC-SCNC: 9 MMOL/L (ref 8–16)
AST SERPL-CCNC: 19 U/L (ref 10–40)
BASOPHILS # BLD AUTO: 0.03 K/UL (ref 0–0.2)
BASOPHILS NFR BLD: 0.7 % (ref 0–1.9)
BILIRUB SERPL-MCNC: 0.2 MG/DL (ref 0.1–1)
BNP SERPL-MCNC: 22 PG/ML (ref 0–99)
BUN SERPL-MCNC: 18 MG/DL (ref 6–20)
CALCIUM SERPL-MCNC: 8.9 MG/DL (ref 8.7–10.5)
CHLORIDE SERPL-SCNC: 107 MMOL/L (ref 95–110)
CO2 SERPL-SCNC: 25 MMOL/L (ref 23–29)
CREAT SERPL-MCNC: 1 MG/DL (ref 0.5–1.4)
DIFFERENTIAL METHOD: ABNORMAL
EOSINOPHIL # BLD AUTO: 0.1 K/UL (ref 0–0.5)
EOSINOPHIL NFR BLD: 2.4 % (ref 0–8)
ERYTHROCYTE [DISTWIDTH] IN BLOOD BY AUTOMATED COUNT: 13.8 % (ref 11.5–14.5)
EST. GFR  (AFRICAN AMERICAN): >60 ML/MIN/1.73 M^2
EST. GFR  (NON AFRICAN AMERICAN): >60 ML/MIN/1.73 M^2
GLUCOSE SERPL-MCNC: 142 MG/DL (ref 70–110)
HCT VFR BLD AUTO: 39.3 % (ref 40–54)
HGB BLD-MCNC: 12.5 G/DL (ref 14–18)
IMM GRANULOCYTES # BLD AUTO: 0.01 K/UL (ref 0–0.04)
IMM GRANULOCYTES NFR BLD AUTO: 0.2 % (ref 0–0.5)
LYMPHOCYTES # BLD AUTO: 1.9 K/UL (ref 1–4.8)
LYMPHOCYTES NFR BLD: 46.1 % (ref 18–48)
MCH RBC QN AUTO: 28 PG (ref 27–31)
MCHC RBC AUTO-ENTMCNC: 31.8 G/DL (ref 32–36)
MCV RBC AUTO: 88 FL (ref 82–98)
MONOCYTES # BLD AUTO: 0.6 K/UL (ref 0.3–1)
MONOCYTES NFR BLD: 14.8 % (ref 4–15)
NEUTROPHILS # BLD AUTO: 1.5 K/UL (ref 1.8–7.7)
NEUTROPHILS NFR BLD: 35.8 % (ref 38–73)
NRBC BLD-RTO: 0 /100 WBC
PLATELET # BLD AUTO: 233 K/UL (ref 150–350)
PMV BLD AUTO: 10.1 FL (ref 9.2–12.9)
POTASSIUM SERPL-SCNC: 3.9 MMOL/L (ref 3.5–5.1)
PROT SERPL-MCNC: 6.8 G/DL (ref 6–8.4)
RBC # BLD AUTO: 4.46 M/UL (ref 4.6–6.2)
SARS-COV-2 RDRP RESP QL NAA+PROBE: NEGATIVE
SODIUM SERPL-SCNC: 141 MMOL/L (ref 136–145)
TROPONIN I SERPL DL<=0.01 NG/ML-MCNC: 0.02 NG/ML (ref 0–0.03)
WBC # BLD AUTO: 4.19 K/UL (ref 3.9–12.7)

## 2020-09-03 PROCEDURE — 93010 ELECTROCARDIOGRAM REPORT: CPT | Mod: ,,, | Performed by: INTERNAL MEDICINE

## 2020-09-03 PROCEDURE — 84484 ASSAY OF TROPONIN QUANT: CPT | Mod: ER

## 2020-09-03 PROCEDURE — U0002 COVID-19 LAB TEST NON-CDC: HCPCS | Mod: ER

## 2020-09-03 PROCEDURE — 80053 COMPREHEN METABOLIC PANEL: CPT | Mod: ER

## 2020-09-03 PROCEDURE — 93005 ELECTROCARDIOGRAM TRACING: CPT | Mod: ER

## 2020-09-03 PROCEDURE — 93010 EKG 12-LEAD: ICD-10-PCS | Mod: ,,, | Performed by: INTERNAL MEDICINE

## 2020-09-03 PROCEDURE — 85025 COMPLETE CBC W/AUTO DIFF WBC: CPT | Mod: ER

## 2020-09-03 PROCEDURE — 99285 EMERGENCY DEPT VISIT HI MDM: CPT | Mod: 25,ER

## 2020-09-03 PROCEDURE — 83880 ASSAY OF NATRIURETIC PEPTIDE: CPT | Mod: ER

## 2020-09-03 RX ORDER — OMEPRAZOLE 20 MG/1
20 CAPSULE, DELAYED RELEASE ORAL DAILY
Qty: 14 CAPSULE | Refills: 0 | Status: SHIPPED | OUTPATIENT
Start: 2020-09-03 | End: 2020-09-17

## 2020-09-03 NOTE — Clinical Note
Braulio Copeland was seen and treated in our emergency department on 9/3/2020.  He may return to work on 09/04/2020.       If you have any questions or concerns, please don't hesitate to call.       RN

## 2020-09-03 NOTE — Clinical Note
Alma Copeland accompanied Braulio Copeland to the emergency department on 9/3/2020. They may return to work on 09/04/2020.      If you have any questions or concerns, please don't hesitate to call.       REGINALDO

## 2020-09-03 NOTE — ED PROVIDER NOTES
Encounter Date: 9/3/2020       History     Chief Complaint   Patient presents with    Shortness of Breath     states difficulty breathing after walking to restroom and lying back down     Braulio Copeland is a 55 y.o. male who presents with gradual onset, moderate, transient difficulty breathing after waking up in the middle of the night to use the restroom and going lay back down. As he was laying on his side, he felt like he couldn't breathe well, and wanted to come get evaluated. He mentions his reflux seemed to be flaring leading up to this.  He states the sensation is now resolved.  He reports no chest pain or tightness during the incident.  He has had no prior episodes.  He has no other complaints.           Review of patient's allergies indicates:  No Known Allergies  Past Medical History:   Diagnosis Date    Back pain      Past Surgical History:   Procedure Laterality Date    KNEE SURGERY Left      Family History   Problem Relation Age of Onset    Hypertension Unknown      Social History     Tobacco Use    Smoking status: Never Smoker    Smokeless tobacco: Never Used   Substance Use Topics    Alcohol use: No    Drug use: No     Review of Systems   Constitutional: Negative.  Negative for fever.   HENT: Negative.    Eyes: Negative.    Respiratory: Positive for shortness of breath. Negative for cough and chest tightness.    Cardiovascular: Negative.  Negative for chest pain and leg swelling.   Gastrointestinal: Positive for abdominal pain (LUQ, radiating to chest and throat; feels like prior GERD ).   Endocrine: Negative.    Genitourinary: Negative.    Musculoskeletal: Negative.    Skin: Negative.    Allergic/Immunologic: Negative.    Neurological: Negative.    Hematological: Negative.    Psychiatric/Behavioral: Negative.    All other systems reviewed and are negative.      Physical Exam     Initial Vitals [09/03/20 0242]   BP Pulse Resp Temp SpO2   (!) 154/88 76 20 98 °F (36.7 °C) 98 %      MAP       --          Physical Exam    Nursing note and vitals reviewed.  Constitutional: He appears well-developed and well-nourished. No distress.   HENT:   Head: Normocephalic and atraumatic.   Right Ear: External ear normal.   Left Ear: External ear normal.   Nose: Nose normal.   Eyes: Conjunctivae and EOM are normal. Pupils are equal, round, and reactive to light.   Neck: Neck supple.   Cardiovascular: Normal rate, regular rhythm and intact distal pulses. Exam reveals no gallop and no friction rub.    No murmur heard.  Pulmonary/Chest: Breath sounds normal. No stridor. No respiratory distress. He has no wheezes. He has no rhonchi. He has no rales. He exhibits no tenderness.   Abdominal: Soft. He exhibits no distension. There is no abdominal tenderness.   Musculoskeletal: Normal range of motion. No edema.   Neurological: He is alert and oriented to person, place, and time. He has normal strength.   Skin: Skin is warm and dry. Capillary refill takes less than 2 seconds.   Psychiatric: He has a normal mood and affect. His behavior is normal. Judgment and thought content normal.         ED Course   Procedures  Labs Reviewed   CBC W/ AUTO DIFFERENTIAL - Abnormal; Notable for the following components:       Result Value    RBC 4.46 (*)     Hemoglobin 12.5 (*)     Hematocrit 39.3 (*)     Mean Corpuscular Hemoglobin Conc 31.8 (*)     Gran # (ANC) 1.5 (*)     Gran% 35.8 (*)     All other components within normal limits   COMPREHENSIVE METABOLIC PANEL - Abnormal; Notable for the following components:    Glucose 142 (*)     All other components within normal limits   TROPONIN I   B-TYPE NATRIURETIC PEPTIDE   SARS-COV-2 RNA AMPLIFICATION, QUAL          Imaging Results          X-Ray Chest AP Portable (In process)             Emergency Physician X-Ray Interpretation: No acute pathology        EKG Interpretation by Emergency Physician: Rhythm: normal sinus.  No acute ST-T changes. Rate: 71 bpm.                Recent Results (from the past 24  hour(s))   CBC auto differential    Collection Time: 09/03/20  3:13 AM   Result Value Ref Range    WBC 4.19 3.90 - 12.70 K/uL    RBC 4.46 (L) 4.60 - 6.20 M/uL    Hemoglobin 12.5 (L) 14.0 - 18.0 g/dL    Hematocrit 39.3 (L) 40.0 - 54.0 %    Mean Corpuscular Volume 88 82 - 98 fL    Mean Corpuscular Hemoglobin 28.0 27.0 - 31.0 pg    Mean Corpuscular Hemoglobin Conc 31.8 (L) 32.0 - 36.0 g/dL    RDW 13.8 11.5 - 14.5 %    Platelets 233 150 - 350 K/uL    MPV 10.1 9.2 - 12.9 fL    Immature Granulocytes 0.2 0.0 - 0.5 %    Gran # (ANC) 1.5 (L) 1.8 - 7.7 K/uL    Immature Grans (Abs) 0.01 0.00 - 0.04 K/uL    Lymph # 1.9 1.0 - 4.8 K/uL    Mono # 0.6 0.3 - 1.0 K/uL    Eos # 0.1 0.0 - 0.5 K/uL    Baso # 0.03 0.00 - 0.20 K/uL    nRBC 0 0 /100 WBC    Gran% 35.8 (L) 38.0 - 73.0 %    Lymph% 46.1 18.0 - 48.0 %    Mono% 14.8 4.0 - 15.0 %    Eosinophil% 2.4 0.0 - 8.0 %    Basophil% 0.7 0.0 - 1.9 %    Differential Method Automated    Comprehensive metabolic panel    Collection Time: 09/03/20  3:13 AM   Result Value Ref Range    Sodium 141 136 - 145 mmol/L    Potassium 3.9 3.5 - 5.1 mmol/L    Chloride 107 95 - 110 mmol/L    CO2 25 23 - 29 mmol/L    Glucose 142 (H) 70 - 110 mg/dL    BUN, Bld 18 6 - 20 mg/dL    Creatinine 1.0 0.5 - 1.4 mg/dL    Calcium 8.9 8.7 - 10.5 mg/dL    Total Protein 6.8 6.0 - 8.4 g/dL    Albumin 3.7 3.5 - 5.2 g/dL    Total Bilirubin 0.2 0.1 - 1.0 mg/dL    Alkaline Phosphatase 86 55 - 135 U/L    AST 19 10 - 40 U/L    ALT 35 10 - 44 U/L    Anion Gap 9 8 - 16 mmol/L    eGFR if African American >60.0 >60 mL/min/1.73 m^2    eGFR if non African American >60.0 >60 mL/min/1.73 m^2   Troponin I    Collection Time: 09/03/20  3:13 AM   Result Value Ref Range    Troponin I 0.020 0.000 - 0.026 ng/mL   Brain Natriuretic Peptide    Collection Time: 09/03/20  3:13 AM   Result Value Ref Range    BNP 22 0 - 99 pg/mL   COVID-19 Rapid Screening    Collection Time: 09/03/20  3:13 AM   Result Value Ref Range    SARS-CoV-2 RNA,  Amplification, Qual Negative Negative       Medications - No data to display     I discussed with patient and/or family/caretaker that evaluation in the ED does not suggest any emergent or life threatening medical conditions requiring immediate intervention beyond what was provided in the ED, and I believe patient is safe for discharge.  Regardless, an unremarkable evaluation in the ED does not preclude the development or presence of a serious of life threatening condition. As such, patient was instructed to return immediately for any worsening or change in current symptoms.                        Clinical Impression:       ICD-10-CM ICD-9-CM   1. Gastroesophageal reflux disease, esophagitis presence not specified  K21.9 530.81   2. SOB (shortness of breath)  R06.02 786.05             ED Disposition Condition    Discharge Stable        ED Prescriptions     Medication Sig Dispense Start Date End Date Auth. Provider    omeprazole (PRILOSEC) 20 MG capsule Take 1 capsule (20 mg total) by mouth once daily. for 14 days 14 capsule 9/3/2020 9/17/2020 Orlando Tubbs MD        Follow-up Information     Follow up With Specialties Details Why Contact Info    Anam Andrews MD Family Medicine In 2 weeks  [address] [place] [id number]  [phone]      [facility] Emergency Medicine  As needed, If symptoms worsen [id number] [facility] [address]  [facility] [id number] [phone]                                     Orlando Tubbs MD  09/03/20 0418

## 2022-08-07 ENCOUNTER — HOSPITAL ENCOUNTER (INPATIENT)
Facility: HOSPITAL | Age: 57
LOS: 1 days | Discharge: HOME | DRG: 194 | End: 2022-08-09
Attending: EMERGENCY MEDICINE | Admitting: INTERNAL MEDICINE
Payer: COMMERCIAL

## 2022-08-07 DIAGNOSIS — M62.82 RHABDOMYOLYSIS: ICD-10-CM

## 2022-08-07 DIAGNOSIS — A41.9 SEPSIS: ICD-10-CM

## 2022-08-07 DIAGNOSIS — T68.XXXA HYPOTHERMIA, INITIAL ENCOUNTER: ICD-10-CM

## 2022-08-07 DIAGNOSIS — J18.9 PNEUMONIA OF RIGHT LOWER LOBE DUE TO INFECTIOUS ORGANISM: Primary | ICD-10-CM

## 2022-08-07 DIAGNOSIS — M62.82 NON-TRAUMATIC RHABDOMYOLYSIS: ICD-10-CM

## 2022-08-07 DIAGNOSIS — E87.1 HYPONATREMIA: ICD-10-CM

## 2022-08-07 DIAGNOSIS — R07.9 CHEST PAIN: ICD-10-CM

## 2022-08-07 PROBLEM — F41.9 ANXIETY: Status: ACTIVE | Noted: 2022-08-07

## 2022-08-07 PROBLEM — R31.9 URINARY TRACT INFECTION WITH HEMATURIA: Status: ACTIVE | Noted: 2022-08-07

## 2022-08-07 PROBLEM — N39.0 UTI (URINARY TRACT INFECTION): Status: ACTIVE | Noted: 2022-08-07

## 2022-08-07 LAB
ALBUMIN SERPL BCP-MCNC: 3.5 G/DL (ref 3.5–5.2)
ALP SERPL-CCNC: 84 U/L (ref 55–135)
ALT SERPL W/O P-5'-P-CCNC: 148 U/L (ref 10–44)
AMORPH CRY UR QL COMP ASSIST: ABNORMAL
ANION GAP SERPL CALC-SCNC: 14 MMOL/L (ref 8–16)
AST SERPL-CCNC: 127 U/L (ref 10–40)
BACTERIA #/AREA URNS AUTO: ABNORMAL /HPF
BASOPHILS # BLD AUTO: 0.02 K/UL (ref 0–0.2)
BASOPHILS NFR BLD: 0.5 % (ref 0–1.9)
BILIRUB SERPL-MCNC: 0.7 MG/DL (ref 0.1–1)
BILIRUB UR QL STRIP: NEGATIVE
BUN SERPL-MCNC: 11 MG/DL (ref 6–20)
CALCIUM SERPL-MCNC: 9.5 MG/DL (ref 8.7–10.5)
CHLORIDE SERPL-SCNC: 93 MMOL/L (ref 95–110)
CK SERPL-CCNC: 1708 U/L (ref 20–200)
CK SERPL-CCNC: 2170 U/L (ref 20–200)
CLARITY UR REFRACT.AUTO: CLEAR
CO2 SERPL-SCNC: 23 MMOL/L (ref 23–29)
COLOR UR AUTO: YELLOW
CREAT SERPL-MCNC: 0.9 MG/DL (ref 0.5–1.4)
CTP QC/QA: YES
DIFFERENTIAL METHOD: ABNORMAL
EOSINOPHIL # BLD AUTO: 0 K/UL (ref 0–0.5)
EOSINOPHIL NFR BLD: 0.7 % (ref 0–8)
ERYTHROCYTE [DISTWIDTH] IN BLOOD BY AUTOMATED COUNT: 14.2 % (ref 11.5–14.5)
EST. GFR  (NO RACE VARIABLE): >60 ML/MIN/1.73 M^2
GLUCOSE SERPL-MCNC: 106 MG/DL (ref 70–110)
GLUCOSE UR QL STRIP: NEGATIVE
HCT VFR BLD AUTO: 40.2 % (ref 40–54)
HGB BLD-MCNC: 13.3 G/DL (ref 14–18)
HGB UR QL STRIP: ABNORMAL
IMM GRANULOCYTES # BLD AUTO: 0.02 K/UL (ref 0–0.04)
IMM GRANULOCYTES NFR BLD AUTO: 0.5 % (ref 0–0.5)
KETONES UR QL STRIP: NEGATIVE
LACTATE SERPL-SCNC: 1.9 MMOL/L (ref 0.5–2.2)
LEUKOCYTE ESTERASE UR QL STRIP: NEGATIVE
LYMPHOCYTES # BLD AUTO: 0.8 K/UL (ref 1–4.8)
LYMPHOCYTES NFR BLD: 18.4 % (ref 18–48)
MAGNESIUM SERPL-MCNC: 2.1 MG/DL (ref 1.6–2.6)
MCH RBC QN AUTO: 27.5 PG (ref 27–31)
MCHC RBC AUTO-ENTMCNC: 33.1 G/DL (ref 32–36)
MCV RBC AUTO: 83 FL (ref 82–98)
MICROSCOPIC COMMENT: ABNORMAL
MONOCYTES # BLD AUTO: 0.7 K/UL (ref 0.3–1)
MONOCYTES NFR BLD: 16.4 % (ref 4–15)
NEUTROPHILS # BLD AUTO: 2.6 K/UL (ref 1.8–7.7)
NEUTROPHILS NFR BLD: 63.5 % (ref 38–73)
NITRITE UR QL STRIP: NEGATIVE
NRBC BLD-RTO: 0 /100 WBC
PH UR STRIP: 6 [PH] (ref 5–8)
PHOSPHATE SERPL-MCNC: 2.5 MG/DL (ref 2.7–4.5)
PLATELET # BLD AUTO: 207 K/UL (ref 150–450)
PMV BLD AUTO: 10.6 FL (ref 9.2–12.9)
POTASSIUM SERPL-SCNC: 3.5 MMOL/L (ref 3.5–5.1)
PROCALCITONIN SERPL IA-MCNC: 0.31 NG/ML
PROT SERPL-MCNC: 8.4 G/DL (ref 6–8.4)
PROT UR QL STRIP: NEGATIVE
RBC # BLD AUTO: 4.84 M/UL (ref 4.6–6.2)
RBC #/AREA URNS AUTO: 20 /HPF (ref 0–4)
SARS-COV-2 RDRP RESP QL NAA+PROBE: NEGATIVE
SODIUM SERPL-SCNC: 130 MMOL/L (ref 136–145)
SP GR UR STRIP: 1 (ref 1–1.03)
SQUAMOUS #/AREA URNS AUTO: 2 /HPF
TROPONIN I SERPL DL<=0.01 NG/ML-MCNC: 0.01 NG/ML (ref 0–0.03)
URN SPEC COLLECT METH UR: ABNORMAL
UROBILINOGEN UR STRIP-ACNC: NEGATIVE EU/DL
WBC # BLD AUTO: 4.14 K/UL (ref 3.9–12.7)
WBC #/AREA URNS AUTO: 5 /HPF (ref 0–5)

## 2022-08-07 PROCEDURE — 63600175 PHARM REV CODE 636 W HCPCS: Mod: ER | Performed by: EMERGENCY MEDICINE

## 2022-08-07 PROCEDURE — A4216 STERILE WATER/SALINE, 10 ML: HCPCS | Performed by: NURSE PRACTITIONER

## 2022-08-07 PROCEDURE — 80053 COMPREHEN METABOLIC PANEL: CPT | Mod: ER | Performed by: EMERGENCY MEDICINE

## 2022-08-07 PROCEDURE — 93010 ELECTROCARDIOGRAM REPORT: CPT | Mod: ,,, | Performed by: INTERNAL MEDICINE

## 2022-08-07 PROCEDURE — 84145 PROCALCITONIN (PCT): CPT | Mod: ER | Performed by: EMERGENCY MEDICINE

## 2022-08-07 PROCEDURE — G0378 HOSPITAL OBSERVATION PER HR: HCPCS | Mod: ER

## 2022-08-07 PROCEDURE — 87040 BLOOD CULTURE FOR BACTERIA: CPT | Mod: 59 | Performed by: EMERGENCY MEDICINE

## 2022-08-07 PROCEDURE — G0378 HOSPITAL OBSERVATION PER HR: HCPCS

## 2022-08-07 PROCEDURE — 81000 URINALYSIS NONAUTO W/SCOPE: CPT | Mod: ER | Performed by: EMERGENCY MEDICINE

## 2022-08-07 PROCEDURE — 82550 ASSAY OF CK (CPK): CPT | Mod: ER | Performed by: EMERGENCY MEDICINE

## 2022-08-07 PROCEDURE — 96365 THER/PROPH/DIAG IV INF INIT: CPT | Mod: ER

## 2022-08-07 PROCEDURE — 96361 HYDRATE IV INFUSION ADD-ON: CPT

## 2022-08-07 PROCEDURE — 36415 COLL VENOUS BLD VENIPUNCTURE: CPT | Performed by: EMERGENCY MEDICINE

## 2022-08-07 PROCEDURE — 25000003 PHARM REV CODE 250: Performed by: NURSE PRACTITIONER

## 2022-08-07 PROCEDURE — 83735 ASSAY OF MAGNESIUM: CPT | Mod: ER | Performed by: EMERGENCY MEDICINE

## 2022-08-07 PROCEDURE — 63700000 PHARM REV CODE 250 ALT 637 W/O HCPCS: Mod: ER | Performed by: EMERGENCY MEDICINE

## 2022-08-07 PROCEDURE — 83605 ASSAY OF LACTIC ACID: CPT | Mod: ER | Performed by: EMERGENCY MEDICINE

## 2022-08-07 PROCEDURE — 82550 ASSAY OF CK (CPK): CPT | Mod: 91 | Performed by: EMERGENCY MEDICINE

## 2022-08-07 PROCEDURE — 84100 ASSAY OF PHOSPHORUS: CPT | Mod: ER | Performed by: EMERGENCY MEDICINE

## 2022-08-07 PROCEDURE — U0002 COVID-19 LAB TEST NON-CDC: HCPCS | Mod: ER | Performed by: EMERGENCY MEDICINE

## 2022-08-07 PROCEDURE — 84484 ASSAY OF TROPONIN QUANT: CPT | Mod: ER | Performed by: EMERGENCY MEDICINE

## 2022-08-07 PROCEDURE — 87389 HIV-1 AG W/HIV-1&-2 AB AG IA: CPT | Performed by: EMERGENCY MEDICINE

## 2022-08-07 PROCEDURE — 93005 ELECTROCARDIOGRAM TRACING: CPT | Mod: ER

## 2022-08-07 PROCEDURE — 63600175 PHARM REV CODE 636 W HCPCS: Performed by: NURSE PRACTITIONER

## 2022-08-07 PROCEDURE — 96372 THER/PROPH/DIAG INJ SC/IM: CPT | Performed by: NURSE PRACTITIONER

## 2022-08-07 PROCEDURE — 99291 CRITICAL CARE FIRST HOUR: CPT | Mod: 25,ER

## 2022-08-07 PROCEDURE — 93010 EKG 12-LEAD: ICD-10-PCS | Mod: ,,, | Performed by: INTERNAL MEDICINE

## 2022-08-07 PROCEDURE — 25000003 PHARM REV CODE 250: Mod: ER | Performed by: EMERGENCY MEDICINE

## 2022-08-07 PROCEDURE — 85025 COMPLETE CBC W/AUTO DIFF WBC: CPT | Mod: ER | Performed by: EMERGENCY MEDICINE

## 2022-08-07 RX ORDER — SODIUM,POTASSIUM PHOSPHATES 280-250MG
2 POWDER IN PACKET (EA) ORAL
Status: DISCONTINUED | OUTPATIENT
Start: 2022-08-07 | End: 2022-08-07

## 2022-08-07 RX ORDER — HYDROXYZINE PAMOATE 25 MG/1
25 CAPSULE ORAL EVERY 8 HOURS PRN
Status: DISCONTINUED | OUTPATIENT
Start: 2022-08-07 | End: 2022-08-09 | Stop reason: HOSPADM

## 2022-08-07 RX ORDER — AZITHROMYCIN 250 MG/1
250 TABLET, FILM COATED ORAL DAILY
Status: DISCONTINUED | OUTPATIENT
Start: 2022-08-08 | End: 2022-08-09 | Stop reason: HOSPADM

## 2022-08-07 RX ORDER — ACETAMINOPHEN 325 MG/1
650 TABLET ORAL EVERY 8 HOURS PRN
Status: DISCONTINUED | OUTPATIENT
Start: 2022-08-07 | End: 2022-08-09 | Stop reason: HOSPADM

## 2022-08-07 RX ORDER — SODIUM CHLORIDE 9 MG/ML
1000 INJECTION, SOLUTION INTRAVENOUS CONTINUOUS
Status: ACTIVE | OUTPATIENT
Start: 2022-08-07 | End: 2022-08-07

## 2022-08-07 RX ORDER — NALOXONE HCL 0.4 MG/ML
0.02 VIAL (ML) INJECTION
Status: DISCONTINUED | OUTPATIENT
Start: 2022-08-07 | End: 2022-08-09 | Stop reason: HOSPADM

## 2022-08-07 RX ORDER — AZITHROMYCIN 250 MG/1
500 TABLET, FILM COATED ORAL
Status: COMPLETED | OUTPATIENT
Start: 2022-08-07 | End: 2022-08-07

## 2022-08-07 RX ORDER — TALC
6 POWDER (GRAM) TOPICAL NIGHTLY PRN
Status: DISCONTINUED | OUTPATIENT
Start: 2022-08-07 | End: 2022-08-08

## 2022-08-07 RX ORDER — ONDANSETRON 2 MG/ML
4 INJECTION INTRAMUSCULAR; INTRAVENOUS EVERY 8 HOURS PRN
Status: DISCONTINUED | OUTPATIENT
Start: 2022-08-07 | End: 2022-08-09 | Stop reason: HOSPADM

## 2022-08-07 RX ORDER — HYDRALAZINE HYDROCHLORIDE 20 MG/ML
10 INJECTION INTRAMUSCULAR; INTRAVENOUS EVERY 6 HOURS PRN
Status: DISCONTINUED | OUTPATIENT
Start: 2022-08-07 | End: 2022-08-09 | Stop reason: HOSPADM

## 2022-08-07 RX ORDER — LANOLIN ALCOHOL/MO/W.PET/CERES
800 CREAM (GRAM) TOPICAL
Status: DISCONTINUED | OUTPATIENT
Start: 2022-08-07 | End: 2022-08-07

## 2022-08-07 RX ORDER — IBUPROFEN 200 MG
16 TABLET ORAL
Status: DISCONTINUED | OUTPATIENT
Start: 2022-08-07 | End: 2022-08-09 | Stop reason: HOSPADM

## 2022-08-07 RX ORDER — ENOXAPARIN SODIUM 100 MG/ML
40 INJECTION SUBCUTANEOUS EVERY 24 HOURS
Status: DISCONTINUED | OUTPATIENT
Start: 2022-08-07 | End: 2022-08-09 | Stop reason: HOSPADM

## 2022-08-07 RX ORDER — AMOXICILLIN 250 MG
1 CAPSULE ORAL 2 TIMES DAILY PRN
Status: DISCONTINUED | OUTPATIENT
Start: 2022-08-07 | End: 2022-08-09 | Stop reason: HOSPADM

## 2022-08-07 RX ORDER — SODIUM CHLORIDE 0.9 % (FLUSH) 0.9 %
10 SYRINGE (ML) INJECTION EVERY 8 HOURS
Status: DISCONTINUED | OUTPATIENT
Start: 2022-08-07 | End: 2022-08-09 | Stop reason: HOSPADM

## 2022-08-07 RX ORDER — IBUPROFEN 200 MG
24 TABLET ORAL
Status: DISCONTINUED | OUTPATIENT
Start: 2022-08-07 | End: 2022-08-09 | Stop reason: HOSPADM

## 2022-08-07 RX ORDER — GLUCAGON 1 MG
1 KIT INJECTION
Status: DISCONTINUED | OUTPATIENT
Start: 2022-08-07 | End: 2022-08-09 | Stop reason: HOSPADM

## 2022-08-07 RX ADMIN — HYDROXYZINE PAMOATE 25 MG: 25 CAPSULE ORAL at 06:08

## 2022-08-07 RX ADMIN — Medication 10 ML: at 09:08

## 2022-08-07 RX ADMIN — Medication 6 MG: at 09:08

## 2022-08-07 RX ADMIN — AZITHROMYCIN MONOHYDRATE 500 MG: 250 TABLET ORAL at 03:08

## 2022-08-07 RX ADMIN — ENOXAPARIN SODIUM 40 MG: 100 INJECTION SUBCUTANEOUS at 06:08

## 2022-08-07 RX ADMIN — SODIUM CHLORIDE 1000 ML: 0.9 INJECTION, SOLUTION INTRAVENOUS at 03:08

## 2022-08-07 RX ADMIN — CEFTRIAXONE 1 G: 1 INJECTION, SOLUTION INTRAVENOUS at 03:08

## 2022-08-07 RX ADMIN — SODIUM CHLORIDE 500 ML: 0.9 INJECTION, SOLUTION INTRAVENOUS at 06:08

## 2022-08-07 NOTE — ED PROVIDER NOTES
History     Chief Complaint   Patient presents with    Dehydration     Pt present to ED with concerns of being dehydrated, seen in ED recently for same s/s, pt states that he is has been sweating a lot, chills       Review of patient's allergies indicates:  No Known Allergies    History of Present Illness   HPI    8/7/2022, 1:15 PM  The history is provided by the patient    Braulio Copeland is a 57 y.o. male presenting to the ED for chills and sweating a lot.  Patient was seen in the emergency department on August 5, 2022. Patient was diagnosed with urinary tract infection and prescribed ciprofloxacin.  Patient reports that on Tuesday, 5 days prior, patient was working out in the heat and also exposed to rain.  Patient feels like he is dehydrated.  Patient has not had a fever since the visit in the ED.  He notes that his throat feels dry.  Patient is having episodes of weakness.  It is not associated with any chest pain, chest pressure, shortness of breath, difficulty breathing, nausea, vomiting, diarrhea, blood in stool, black tarry stool, headache, or rash      Arrival mode:  Personal Vehicle    PCP: Anam Andrews MD     Allergies:  Review of patient's allergies indicates:  No Known Allergies    Past Medical History:  Past Medical History:   Diagnosis Date    Back pain        Past Surgical History:  Past Surgical History:   Procedure Laterality Date    KNEE SURGERY Left          Family History:  Family History   Problem Relation Age of Onset    Hypertension Unknown        Social History:  Social History     Tobacco Use    Smoking status: Never Smoker    Smokeless tobacco: Never Used   Substance and Sexual Activity    Alcohol use: No    Drug use: No    Sexual activity: Yes     Partners: Female        Review of Systems   Review of Systems   Constitutional: Positive for fatigue. Negative for fever.   HENT: Negative for sore throat.    Respiratory: Negative for shortness of breath.    Cardiovascular: Negative  for chest pain.   Gastrointestinal: Negative for abdominal pain, nausea and vomiting.   Genitourinary: Negative for dysuria.   Musculoskeletal: Negative for back pain.   Skin: Negative for rash.   Neurological: Negative for weakness.   Hematological: Does not bruise/bleed easily.          Physical Exam     Initial Vitals   BP Pulse Resp Temp SpO2   08/07/22 1242 08/07/22 1242 08/07/22 1242 08/07/22 1249 08/07/22 1242   (!) 139/92 80 16 96.5 °F (35.8 °C) 97 %      MAP       --                 Physical Exam    Nursing Notes and Vital Signs Reviewed.  Constitutional: Patient is in no apparent distress. Well-developed and well-nourished.  Head: Atraumatic. Normocephalic.  Eyes: PERRL. EOM intact. Conjunctivae are not pale. No scleral icterus.  ENT: Mucous membranes are moist. Oropharynx is clear and symmetric.    Neck: Supple. Full ROM. No lymphadenopathy.  Cardiovascular: Regular rate. Regular rhythm. No murmurs, rubs, or gallops. Distal pulses are 2+ and symmetric.  Pulmonary/Chest: No respiratory distress. Clear to auscultation bilaterally. No wheezing or rales.  Abdominal: Soft and non-distended.  There is no tenderness.  No rebound, guarding, or rigidity. Good bowel sounds.  Genitourinary: No CVA tenderness  Musculoskeletal: Moves all extremities. No obvious deformities. No edema. No calf tenderness.  Skin: Warm and dry.  Neurological:  Alert, awake, and appropriate.  Normal speech.  No acute focal neurological deficits are appreciated.  Psychiatric: Normal affect. Good eye contact. Appropriate in content.     ED Course     ED Procedures:  Critical Care    Date/Time: 8/7/2022 1:15 PM  Performed by: Cande Mack DO  Authorized by: Cande Mack DO   Direct patient critical care time: 15 minutes  Additional history critical care time: 5 minutes  Ordering / reviewing critical care time: 2 minutes  Documentation critical care time: 10 minutes  Consulting other physicians critical care time: 10 minutes  Total  "critical care time (exclusive of procedural time) : 42 minutes  Critical care time was exclusive of separately billable procedures and treating other patients.  Critical care was necessary to treat or prevent imminent or life-threatening deterioration of the following conditions: sepsis.  Critical care was time spent personally by me on the following activities: blood draw for specimens, discussions with consultants, evaluation of patient's response to treatment, obtaining history from patient or surrogate, ordering and review of laboratory studies, pulse oximetry, review of old charts, re-evaluation of patient's condition, ordering and review of radiographic studies, ordering and performing treatments and interventions, examination of patient, discussions with primary provider and development of treatment plan with patient or surrogate.          ED Vital Signs:  Vitals:    08/07/22 1242 08/07/22 1249 08/07/22 1259 08/07/22 1331   BP: (!) 139/92   135/82   Pulse: 80  80    Resp: 16      Temp:  96.5 °F (35.8 °C)     TempSrc: Oral Rectal     SpO2: 97%      Weight: 114 kg (251 lb 3.4 oz)      Height: 6' 4" (1.93 m)       08/07/22 1332 08/07/22 1402 08/07/22 1406 08/07/22 1412   BP:  139/85     Pulse: 80 78 77    Resp: 19 (!) 21 20    Temp:    97.9 °F (36.6 °C)   TempSrc:    Oral   SpO2: 100% 97% 98%    Weight:       Height:        08/07/22 1632   BP: (!) 137/93   Pulse: 74   Resp: (!) 22   Temp:    TempSrc:    SpO2: 98%   Weight:    Height:        Abnormal Lab Results:  Labs Reviewed   CBC W/ AUTO DIFFERENTIAL - Abnormal; Notable for the following components:       Result Value    Hemoglobin 13.3 (*)     Lymph # 0.8 (*)     Mono % 16.4 (*)     All other components within normal limits    Narrative:     Release to patient->Immediate   COMPREHENSIVE METABOLIC PANEL - Abnormal; Notable for the following components:    Sodium 130 (*)     Chloride 93 (*)      (*)      (*)     All other components within normal " limits    Narrative:     Release to patient->Immediate   PHOSPHORUS - Abnormal; Notable for the following components:    Phosphorus 2.5 (*)     All other components within normal limits    Narrative:     Release to patient->Immediate   URINALYSIS, REFLEX TO URINE CULTURE - Abnormal; Notable for the following components:    Occult Blood UA 1+ (*)     All other components within normal limits    Narrative:     Specimen Source->Urine   PROCALCITONIN - Abnormal; Notable for the following components:    Procalcitonin 0.31 (*)     All other components within normal limits    Narrative:     Release to patient->Immediate   CK - Abnormal; Notable for the following components:    CPK 2170 (*)     All other components within normal limits    Narrative:     Release to patient->Immediate   URINALYSIS MICROSCOPIC - Abnormal; Notable for the following components:    RBC, UA 20 (*)     Bacteria Moderate (*)     All other components within normal limits    Narrative:     Specimen Source->Urine   CULTURE, BLOOD   CULTURE, BLOOD   LACTIC ACID, PLASMA    Narrative:     Release to patient->Immediate   MAGNESIUM    Narrative:     Release to patient->Immediate   TROPONIN I    Narrative:     Release to patient->Immediate   HIV 1 / 2 ANTIBODY   SARS-COV-2 RDRP GENE        All Lab Results:    Results for orders placed or performed during the hospital encounter of 08/07/22   CBC auto differential   Result Value Ref Range    WBC 4.14 3.90 - 12.70 K/uL    RBC 4.84 4.60 - 6.20 M/uL    Hemoglobin 13.3 (L) 14.0 - 18.0 g/dL    Hematocrit 40.2 40.0 - 54.0 %    MCV 83 82 - 98 fL    MCH 27.5 27.0 - 31.0 pg    MCHC 33.1 32.0 - 36.0 g/dL    RDW 14.2 11.5 - 14.5 %    Platelets 207 150 - 450 K/uL    MPV 10.6 9.2 - 12.9 fL    Immature Granulocytes 0.5 0.0 - 0.5 %    Gran # (ANC) 2.6 1.8 - 7.7 K/uL    Immature Grans (Abs) 0.02 0.00 - 0.04 K/uL    Lymph # 0.8 (L) 1.0 - 4.8 K/uL    Mono # 0.7 0.3 - 1.0 K/uL    Eos # 0.0 0.0 - 0.5 K/uL    Baso # 0.02 0.00 -  0.20 K/uL    nRBC 0 0 /100 WBC    Gran % 63.5 38.0 - 73.0 %    Lymph % 18.4 18.0 - 48.0 %    Mono % 16.4 (H) 4.0 - 15.0 %    Eosinophil % 0.7 0.0 - 8.0 %    Basophil % 0.5 0.0 - 1.9 %    Differential Method Automated    Comprehensive metabolic panel   Result Value Ref Range    Sodium 130 (L) 136 - 145 mmol/L    Potassium 3.5 3.5 - 5.1 mmol/L    Chloride 93 (L) 95 - 110 mmol/L    CO2 23 23 - 29 mmol/L    Glucose 106 70 - 110 mg/dL    BUN 11 6 - 20 mg/dL    Creatinine 0.9 0.5 - 1.4 mg/dL    Calcium 9.5 8.7 - 10.5 mg/dL    Total Protein 8.4 6.0 - 8.4 g/dL    Albumin 3.5 3.5 - 5.2 g/dL    Total Bilirubin 0.7 0.1 - 1.0 mg/dL    Alkaline Phosphatase 84 55 - 135 U/L     (H) 10 - 40 U/L     (H) 10 - 44 U/L    Anion Gap 14 8 - 16 mmol/L    eGFR >60.0 >60 mL/min/1.73 m^2   Lactic Acid, Plasma #1   Result Value Ref Range    Lactate (Lactic Acid) 1.9 0.5 - 2.2 mmol/L   Magnesium   Result Value Ref Range    Magnesium 2.1 1.6 - 2.6 mg/dL   Phosphorus   Result Value Ref Range    Phosphorus 2.5 (L) 2.7 - 4.5 mg/dL   Urinalysis, Reflex to Urine Culture Urine, Clean Catch    Specimen: Urine   Result Value Ref Range    Specimen UA Urine, Clean Catch     Color, UA Yellow Yellow, Straw, Marta    Appearance, UA Clear Clear    pH, UA 6.0 5.0 - 8.0    Specific Gravity, UA 1.005 1.005 - 1.030    Protein, UA Negative Negative    Glucose, UA Negative Negative    Ketones, UA Negative Negative    Bilirubin (UA) Negative Negative    Occult Blood UA 1+ (A) Negative    Nitrite, UA Negative Negative    Urobilinogen, UA Negative <2.0 EU/dL    Leukocytes, UA Negative Negative   Procalcitonin   Result Value Ref Range    Procalcitonin 0.31 (H) <0.25 ng/mL   CPK   Result Value Ref Range    CPK 2170 (H) 20 - 200 U/L   Troponin I   Result Value Ref Range    Troponin I 0.009 0.000 - 0.026 ng/mL   Urinalysis Microscopic   Result Value Ref Range    RBC, UA 20 (H) 0 - 4 /hpf    WBC, UA 5 0 - 5 /hpf    Bacteria Moderate (A) None-Occ /hpf    Squam  Epithel, UA 2 /hpf    Amorphous, UA Occasional None-Moderate    Microscopic Comment SEE COMMENT    POCT COVID-19 Rapid Screening   Result Value Ref Range    POC Rapid COVID Negative Negative     Acceptable Yes        The EKG was ordered, reviewed, and independently interpreted by the ED provider.  Rate is 84 beats per minute.  Sinus rhythm.  Normal axis.  No acute ST or T-wave        Imaging Results:  Imaging Results          X-Ray Chest 1 View (Final result)  Result time 08/07/22 13:40:08    Final result by Quinn Booth MD (08/07/22 13:40:08)                 Impression:      Infiltrate right medial lung base.      Electronically signed by: Quinn Booth  Date:    08/07/2022  Time:    13:40             Narrative:    EXAMINATION:  XR CHEST 1 VIEW    CLINICAL HISTORY:  Sepsis;    COMPARISON:  08/05/2022    FINDINGS:  The heart is normal in size.  Developing infiltrate now noted in the right lung base.                                      The Emergency Provider reviewed the vital signs and test results, which are outlined above.     ED Discussion     ED Course as of 08/07/22 1717   Sun Aug 07, 2022   1436 RBC, UA(!): 20 [LB]   1436 Bacteria, UA(!): Moderate [LB]   1436 CPK(!): 2170 [LB]   1450 Spoke with Torsten Quezada NP: Obs Dr Akers. Med Tele [LB]   1452 CPK(!): 2170 [LB]      ED Course User Index  [LB] Cande Mack DO      Latest Reference Range & Units 06/13/15 23:45 09/03/20 03:13 08/05/22 22:55 08/07/22 13:06   AST 10 - 40 U/L 785 (H) 19 82 (H) 127 (H)   ALT 10 - 44 U/L 471 (H) 35 83 (H) 148 (H)   (H): Data is abnormally high      Latest Reference Range & Units 08/05/22 22:55 08/07/22 13:06   CPK 20 - 200 U/L 850 (H) 2170 (H)   (H): Data is abnormally high    4:11 PM      All historical, clinical, radiographic, and laboratory findings were reviewed with the patient/family in detail.  I discussed the indications and treatment need: (Internal Medicine) .    Patient/Family requests transfer to:  "[facility]    Patient/Family understands that [facility] does provide (Internal Medicine) services.     Patient/family verbalized understanding.   All remaining questions and concerns were addressed at that time and the patient/family agrees to proceed accordingly.  Similarly all pertinent details of the encounter were discussed with Torsten Quezada NP at [facility] . Scott Akers MD agrees to accept the patient in transfer based on the needs/patient preferences outlined above.  Patient will be transferred by [facility],Routine , secondary to a need for ongoing Antibiotics, Cardiac Monitoring and Fluids en route.  Risks: of transfer:    loss of vitals signs, permanent neurologic damage, MVC,  resulting in death, or loss of neurologic function.  Benefits of transfer: Internal Medicine.  Patient and family agree and verbalize understanding.     Cande Mack, DO,  FACEP          ED Medication(s):  Medications   cefTRIAXone (ROCEPHIN) 1 g/50 mL D5W IVPB (0 g Intravenous Stopped 8/7/22 1546)   0.9%  NaCl infusion ( Intravenous Verify Only 8/7/22 1609)   azithromycin tablet 500 mg (500 mg Oral Given 8/7/22 1516)             MIPS Measures     Smoker? No     Hypertension: None         Medical Decision Making                MDM  Reviewed: vitals, nursing note and previous chart  Reviewed previous: labs  Interpretation: labs, ECG and x-ray          Portions of this note may have been created with voice recognition software. Occasional "wrong-word" or "sound-a-like" substitutions may have occurred due to the inherent limitations of voice recognition software. Please, read the note carefully and recognize, using context, where substitutions have occurred.            Clinical Impression       ICD-10-CM ICD-9-CM   1. Pneumonia of right lower lobe due to infectious organism  J18.9 486   2. Sepsis  A41.9 038.9     995.91   3. Non-traumatic " rhabdomyolysis  M62.82 728.88   4. Hyponatremia  E87.1 276.1   5. Rhabdomyolysis  M62.82 728.88         ED Disposition       Disposition: Admit to med;tele; observation  Patient condition: Stable                 Cadne Mack,   08/07/22 8140

## 2022-08-07 NOTE — H&P
[facility] Medicine  History & Physical    Patient Name: Braulio Copeland  MRN: [id number]  Patient Class: OP- Observation  Admission Date: 8/7/2022  Attending Physician: Craig Diaz MD   Primary Care Provider: Anam Andrews MD         Patient information was obtained from patient, spouse/SO and ER records.     Subjective:     Principal Problem:Pneumonia    Chief Complaint:   Chief Complaint   Patient presents with    Dehydration     Pt present to ED with concerns of being dehydrated, seen in ED recently for same s/s, pt states that he is has been sweating a lot, chills        HPI: Braulio Copeland is a 57 y.o. male presenting to the ED for chills and sweating a lot.  Patient was seen in the ED on August 5, 2022. Patient was diagnosed with UTI and prescribed ciprofloxacin.  Patient feels like he is dehydrated after working out in the heat.  Patient has not had a fever since the visit in the ED.  He notes that his throat feels dry.  Patient is having episodes of weakness.  It is not associated with any chest pain, chest pressure, shortness of breath, difficulty breathing, nausea, vomiting, diarrhea, blood in stool, black tarry stool, headache, or rash.  In the ED: CXR revealed PNA in right lung base. Lactic acid negative. Procal 0.31, >2170. Transferred to [facility] for management  HM contacted for OBS admission.       Past Medical History:   Diagnosis Date    Back pain        Past Surgical History:   Procedure Laterality Date    KNEE SURGERY Left        Review of patient's allergies indicates:  No Known Allergies    No current facility-administered medications on file prior to encounter.     Current Outpatient Medications on File Prior to Encounter   Medication Sig    ciprofloxacin HCl (CIPRO) 500 MG tablet Take 1 tablet (500 mg total) by mouth 2 (two) times daily. for 5 days    omeprazole (PRILOSEC) 20 MG capsule Take 1 capsule (20 mg total) by mouth once daily. for 14 days      Family History       Problem Relation (Age of Onset)    Hypertension           Tobacco Use    Smoking status: Never Smoker    Smokeless tobacco: Never Used   Substance and Sexual Activity    Alcohol use: No    Drug use: No    Sexual activity: Yes     Partners: Female     Review of Systems   Constitutional:  Positive for fatigue. Negative for appetite change, chills and unexpected weight change.   HENT:  Negative for congestion, mouth sores, sinus pressure, sore throat and trouble swallowing.    Eyes:  Negative for photophobia and visual disturbance.   Respiratory:  Negative for cough, shortness of breath and wheezing.    Cardiovascular:  Negative for chest pain, palpitations and leg swelling.   Gastrointestinal:  Negative for abdominal distention and abdominal pain.   Endocrine: Negative for polydipsia, polyphagia and polyuria.   Genitourinary:  Negative for flank pain, frequency and hematuria.   Musculoskeletal:  Negative for back pain and neck stiffness.   Skin:  Negative for pallor and rash.   Allergic/Immunologic: Negative for immunocompromised state.   Neurological:  Negative for dizziness, tremors, seizures, syncope, speech difficulty, weakness and headaches.   Hematological:  Negative for adenopathy. Does not bruise/bleed easily.   Psychiatric/Behavioral:  Negative for agitation, confusion and suicidal ideas.    Objective:     Vital Signs (Most Recent):  Temp: 97.9 °F (36.6 °C) (08/07/22 1747)  Pulse: 82 (08/07/22 1747)  Resp: 18 (08/07/22 1747)  BP: (!) 154/94 (08/07/22 1747)  SpO2: 97 % (08/07/22 1747)   Vital Signs (24h Range):  Temp:  [96.5 °F (35.8 °C)-97.9 °F (36.6 °C)] 97.9 °F (36.6 °C)  Pulse:  [74-82] 82  Resp:  [16-22] 18  SpO2:  [97 %-100 %] 97 %  BP: (135-154)/(82-94) 154/94     Weight: 114 kg (251 lb 3.4 oz)  Body mass index is 30.58 kg/m².    Physical Exam  Constitutional:       Appearance: Normal appearance. He is obese.   HENT:      Head: Normocephalic and atraumatic.      Nose: No  congestion or rhinorrhea.      Mouth/Throat:      Pharynx: No posterior oropharyngeal erythema.   Eyes:      General: No scleral icterus.     Extraocular Movements: Extraocular movements intact.      Pupils: Pupils are equal, round, and reactive to light.   Neck:      Vascular: No carotid bruit.   Cardiovascular:      Rate and Rhythm: Normal rate and regular rhythm.   Pulmonary:      Effort: Pulmonary effort is normal. No respiratory distress.      Breath sounds: No stridor. Rhonchi present. No wheezing.   Abdominal:      General: There is no distension.      Palpations: Abdomen is soft.      Tenderness: There is no abdominal tenderness. There is no guarding.   Musculoskeletal:         General: No swelling or deformity. Normal range of motion.      Cervical back: Normal range of motion and neck supple. No rigidity.   Skin:     General: Skin is warm and dry.      Capillary Refill: Capillary refill takes less than 2 seconds.      Coloration: Skin is not jaundiced.      Findings: No erythema or rash.   Neurological:      Mental Status: He is alert and oriented to person, place, and time.      Motor: No weakness.   Psychiatric:         Mood and Affect: Mood normal.         Behavior: Behavior normal.         Thought Content: Thought content normal.         Judgment: Judgment normal.         CRANIAL NERVES     CN III, IV, VI   Pupils are equal, round, and reactive to light.     Significant Labs: All pertinent labs within the past 24 hours have been reviewed.    Significant Imaging: I have reviewed all pertinent imaging results/findings within the past 24 hours.    Assessment/Plan:     * Pneumonia  As evidenced by CXR  Rocephin and Azithro initiated  procal 0.31  Lactic acid negative  Should be able to d/c in the am   PSI score: 0.6-0.9% mortality      Anxiety  PRN Vistaril      Non-traumatic rhabdomyolysis  >2170  IVF hydration  Repeat in the AM    Urinary tract infection with hematuria  Rocephin initiated  BC x2  drawn  Urine Cx pending   Lactic acid negative  IVF hydration         VTE Risk Mitigation (From admission, onward)         Ordered     enoxaparin injection 40 mg  Daily         08/07/22 1754     IP VTE HIGH RISK PATIENT  Once         08/07/22 1754     Place sequential compression device  Until discontinued         08/07/22 1754                   Torsten Quezada NP  Department of Hospital Medicine   '[place] - Telemetry ([facility])

## 2022-08-07 NOTE — ASSESSMENT & PLAN NOTE
As evidenced by CXR  Rocephin and Steve initiated  procal 0.31  Lactic acid negative  Should be able to d/c in the am   PSI score: 0.6-0.9% mortality

## 2022-08-07 NOTE — SUBJECTIVE & OBJECTIVE
Past Medical History:   Diagnosis Date    Back pain        Past Surgical History:   Procedure Laterality Date    KNEE SURGERY Left        Review of patient's allergies indicates:  No Known Allergies    No current facility-administered medications on file prior to encounter.     Current Outpatient Medications on File Prior to Encounter   Medication Sig    ciprofloxacin HCl (CIPRO) 500 MG tablet Take 1 tablet (500 mg total) by mouth 2 (two) times daily. for 5 days    omeprazole (PRILOSEC) 20 MG capsule Take 1 capsule (20 mg total) by mouth once daily. for 14 days     Family History       Problem Relation (Age of Onset)    Hypertension           Tobacco Use    Smoking status: Never Smoker    Smokeless tobacco: Never Used   Substance and Sexual Activity    Alcohol use: No    Drug use: No    Sexual activity: Yes     Partners: Female     Review of Systems   Constitutional:  Positive for fatigue. Negative for appetite change, chills and unexpected weight change.   HENT:  Negative for congestion, mouth sores, sinus pressure, sore throat and trouble swallowing.    Eyes:  Negative for photophobia and visual disturbance.   Respiratory:  Negative for cough, shortness of breath and wheezing.    Cardiovascular:  Negative for chest pain, palpitations and leg swelling.   Gastrointestinal:  Negative for abdominal distention and abdominal pain.   Endocrine: Negative for polydipsia, polyphagia and polyuria.   Genitourinary:  Negative for flank pain, frequency and hematuria.   Musculoskeletal:  Negative for back pain and neck stiffness.   Skin:  Negative for pallor and rash.   Allergic/Immunologic: Negative for immunocompromised state.   Neurological:  Negative for dizziness, tremors, seizures, syncope, speech difficulty, weakness and headaches.   Hematological:  Negative for adenopathy. Does not bruise/bleed easily.   Psychiatric/Behavioral:  Negative for agitation, confusion and suicidal ideas.    Objective:     Vital Signs (Most  Recent):  Temp: 97.9 °F (36.6 °C) (08/07/22 1747)  Pulse: 82 (08/07/22 1747)  Resp: 18 (08/07/22 1747)  BP: (!) 154/94 (08/07/22 1747)  SpO2: 97 % (08/07/22 1747)   Vital Signs (24h Range):  Temp:  [96.5 °F (35.8 °C)-97.9 °F (36.6 °C)] 97.9 °F (36.6 °C)  Pulse:  [74-82] 82  Resp:  [16-22] 18  SpO2:  [97 %-100 %] 97 %  BP: (135-154)/(82-94) 154/94     Weight: 114 kg (251 lb 3.4 oz)  Body mass index is 30.58 kg/m².    Physical Exam  Constitutional:       Appearance: Normal appearance. He is obese.   HENT:      Head: Normocephalic and atraumatic.      Nose: No congestion or rhinorrhea.      Mouth/Throat:      Pharynx: No posterior oropharyngeal erythema.   Eyes:      General: No scleral icterus.     Extraocular Movements: Extraocular movements intact.      Pupils: Pupils are equal, round, and reactive to light.   Neck:      Vascular: No carotid bruit.   Cardiovascular:      Rate and Rhythm: Normal rate and regular rhythm.   Pulmonary:      Effort: Pulmonary effort is normal. No respiratory distress.      Breath sounds: No stridor. Rhonchi present. No wheezing.   Abdominal:      General: There is no distension.      Palpations: Abdomen is soft.      Tenderness: There is no abdominal tenderness. There is no guarding.   Musculoskeletal:         General: No swelling or deformity. Normal range of motion.      Cervical back: Normal range of motion and neck supple. No rigidity.   Skin:     General: Skin is warm and dry.      Capillary Refill: Capillary refill takes less than 2 seconds.      Coloration: Skin is not jaundiced.      Findings: No erythema or rash.   Neurological:      Mental Status: He is alert and oriented to person, place, and time.      Motor: No weakness.   Psychiatric:         Mood and Affect: Mood normal.         Behavior: Behavior normal.         Thought Content: Thought content normal.         Judgment: Judgment normal.         CRANIAL NERVES     CN III, IV, VI   Pupils are equal, round, and reactive to  light.     Significant Labs: All pertinent labs within the past 24 hours have been reviewed.    Significant Imaging: I have reviewed all pertinent imaging results/findings within the past 24 hours.

## 2022-08-07 NOTE — HPI
Braulio Copeland is a 57 y.o. male presenting to the ED for chills and sweating a lot.  Patient was seen in the ED on August 5, 2022. Patient was diagnosed with UTI and prescribed ciprofloxacin.  Patient feels like he is dehydrated after working out in the heat.  Patient has not had a fever since the visit in the ED.  He notes that his throat feels dry.  Patient is having episodes of weakness.  It is not associated with any chest pain, chest pressure, shortness of breath, difficulty breathing, nausea, vomiting, diarrhea, blood in stool, black tarry stool, headache, or rash.  In the ED: CXR revealed PNA in right lung base. Lactic acid negative. Procal 0.31, >2170. Transferred to [facility] for management   contacted for OBS admission.

## 2022-08-08 LAB
ANION GAP SERPL CALC-SCNC: 10 MMOL/L (ref 8–16)
BASOPHILS # BLD AUTO: 0.02 K/UL (ref 0–0.2)
BASOPHILS NFR BLD: 0.5 % (ref 0–1.9)
BUN SERPL-MCNC: 10 MG/DL (ref 6–20)
CALCIUM SERPL-MCNC: 8.5 MG/DL (ref 8.7–10.5)
CHLORIDE SERPL-SCNC: 101 MMOL/L (ref 95–110)
CK SERPL-CCNC: 1530 U/L (ref 20–200)
CO2 SERPL-SCNC: 25 MMOL/L (ref 23–29)
CREAT SERPL-MCNC: 0.9 MG/DL (ref 0.5–1.4)
DIFFERENTIAL METHOD: ABNORMAL
EOSINOPHIL # BLD AUTO: 0 K/UL (ref 0–0.5)
EOSINOPHIL NFR BLD: 0.5 % (ref 0–8)
ERYTHROCYTE [DISTWIDTH] IN BLOOD BY AUTOMATED COUNT: 14 % (ref 11.5–14.5)
EST. GFR  (NO RACE VARIABLE): >60 ML/MIN/1.73 M^2
ESTIMATED AVG GLUCOSE: 120 MG/DL (ref 68–131)
GLUCOSE SERPL-MCNC: 93 MG/DL (ref 70–110)
HBA1C MFR BLD: 5.8 % (ref 4–5.6)
HCT VFR BLD AUTO: 35.7 % (ref 40–54)
HGB BLD-MCNC: 11.7 G/DL (ref 14–18)
HIV 1+2 AB+HIV1 P24 AG SERPL QL IA: NEGATIVE
IMM GRANULOCYTES # BLD AUTO: 0.01 K/UL (ref 0–0.04)
IMM GRANULOCYTES NFR BLD AUTO: 0.3 % (ref 0–0.5)
LYMPHOCYTES # BLD AUTO: 1.2 K/UL (ref 1–4.8)
LYMPHOCYTES NFR BLD: 32.1 % (ref 18–48)
MCH RBC QN AUTO: 27 PG (ref 27–31)
MCHC RBC AUTO-ENTMCNC: 32.8 G/DL (ref 32–36)
MCV RBC AUTO: 82 FL (ref 82–98)
MONOCYTES # BLD AUTO: 0.9 K/UL (ref 0.3–1)
MONOCYTES NFR BLD: 23.4 % (ref 4–15)
NEUTROPHILS # BLD AUTO: 1.6 K/UL (ref 1.8–7.7)
NEUTROPHILS NFR BLD: 43.2 % (ref 38–73)
NRBC BLD-RTO: 0 /100 WBC
PLATELET # BLD AUTO: 225 K/UL (ref 150–450)
PMV BLD AUTO: 11.1 FL (ref 9.2–12.9)
POTASSIUM SERPL-SCNC: 4.1 MMOL/L (ref 3.5–5.1)
RBC # BLD AUTO: 4.33 M/UL (ref 4.6–6.2)
SODIUM SERPL-SCNC: 136 MMOL/L (ref 136–145)
WBC # BLD AUTO: 3.68 K/UL (ref 3.9–12.7)

## 2022-08-08 PROCEDURE — A4216 STERILE WATER/SALINE, 10 ML: HCPCS | Performed by: NURSE PRACTITIONER

## 2022-08-08 PROCEDURE — 63600175 PHARM REV CODE 636 W HCPCS: Performed by: NURSE PRACTITIONER

## 2022-08-08 PROCEDURE — 63600175 PHARM REV CODE 636 W HCPCS: Performed by: EMERGENCY MEDICINE

## 2022-08-08 PROCEDURE — 96361 HYDRATE IV INFUSION ADD-ON: CPT

## 2022-08-08 PROCEDURE — 85025 COMPLETE CBC W/AUTO DIFF WBC: CPT | Performed by: NURSE PRACTITIONER

## 2022-08-08 PROCEDURE — 82550 ASSAY OF CK (CPK): CPT | Performed by: EMERGENCY MEDICINE

## 2022-08-08 PROCEDURE — 83036 HEMOGLOBIN GLYCOSYLATED A1C: CPT | Performed by: NURSE PRACTITIONER

## 2022-08-08 PROCEDURE — 21400001 HC TELEMETRY ROOM

## 2022-08-08 PROCEDURE — 25000003 PHARM REV CODE 250: Performed by: NURSE PRACTITIONER

## 2022-08-08 PROCEDURE — 80048 BASIC METABOLIC PNL TOTAL CA: CPT | Performed by: NURSE PRACTITIONER

## 2022-08-08 PROCEDURE — 87086 URINE CULTURE/COLONY COUNT: CPT | Performed by: NURSE PRACTITIONER

## 2022-08-08 PROCEDURE — 96366 THER/PROPH/DIAG IV INF ADDON: CPT

## 2022-08-08 PROCEDURE — 36415 COLL VENOUS BLD VENIPUNCTURE: CPT | Performed by: EMERGENCY MEDICINE

## 2022-08-08 PROCEDURE — 63700000 PHARM REV CODE 250 ALT 637 W/O HCPCS: Performed by: EMERGENCY MEDICINE

## 2022-08-08 PROCEDURE — 36415 COLL VENOUS BLD VENIPUNCTURE: CPT | Performed by: NURSE PRACTITIONER

## 2022-08-08 RX ORDER — ZOLPIDEM TARTRATE 5 MG/1
5 TABLET ORAL NIGHTLY PRN
Status: DISCONTINUED | OUTPATIENT
Start: 2022-08-08 | End: 2022-08-09 | Stop reason: HOSPADM

## 2022-08-08 RX ORDER — SODIUM CHLORIDE 9 MG/ML
INJECTION, SOLUTION INTRAVENOUS CONTINUOUS
Status: DISCONTINUED | OUTPATIENT
Start: 2022-08-08 | End: 2022-08-09 | Stop reason: HOSPADM

## 2022-08-08 RX ADMIN — Medication 10 ML: at 09:08

## 2022-08-08 RX ADMIN — AZITHROMYCIN MONOHYDRATE 250 MG: 250 TABLET ORAL at 09:08

## 2022-08-08 RX ADMIN — ZOLPIDEM TARTRATE 5 MG: 5 TABLET ORAL at 09:08

## 2022-08-08 RX ADMIN — SENNOSIDES AND DOCUSATE SODIUM 1 TABLET: 8.6; 5 TABLET ORAL at 09:08

## 2022-08-08 RX ADMIN — ENOXAPARIN SODIUM 40 MG: 100 INJECTION SUBCUTANEOUS at 04:08

## 2022-08-08 RX ADMIN — Medication 10 ML: at 06:08

## 2022-08-08 RX ADMIN — ACETAMINOPHEN 650 MG: 325 TABLET ORAL at 12:08

## 2022-08-08 RX ADMIN — CEFTRIAXONE 1 G: 1 INJECTION, SOLUTION INTRAVENOUS at 04:08

## 2022-08-08 RX ADMIN — SODIUM CHLORIDE: 0.9 INJECTION, SOLUTION INTRAVENOUS at 01:08

## 2022-08-08 NOTE — PLAN OF CARE
[facility] - Telemetry (Hospital)  Initial Discharge Assessment       Primary Care Provider: Anam Andrews MD    Admission Diagnosis: Rhabdomyolysis [M62.82]  Hyponatremia [E87.1]  Sepsis [A41.9]  Hypothermia, initial encounter [T68.XXXA]  Non-traumatic rhabdomyolysis [M62.82]  Pneumonia of right lower lobe due to infectious organism [J18.9]    Admission Date: 8/7/2022  Expected Discharge Date:     Discharge Barriers Identified: None    Payor: BLUE CROSS BLUE SHIELD / Plan: [place] PPO / Product Type: PPO /     Extended Emergency Contact Information  Primary Emergency Contact: Bianka Copeland  Address: [address] [place], [place] [id number] [place] [address]  Home Phone: [phone]  Mobile Phone: [phone]  Relation: Spouse    Discharge Plan A: Home with family         [id number] - [place], [place] - [id number] [facility]  [place] [address]  Phone: [phone] Fax: [phone]      Initial Assessment (most recent)     Adult Discharge Assessment - 08/08/22 1552        Discharge Assessment    Assessment Type Discharge Planning Assessment     Confirmed/corrected address, phone number and insurance Yes     Confirmed Demographics Correct on Facesheet     Source of Information patient     Communicated GURINDER with patient/caregiver Date not available/Unable to determine     Reason For Admission Pneumonia     Lives With spouse     Facility Arrived From: home     Do you expect to return to your current living situation? Yes     Do you have help at home or someone to help you manage your care at home? Yes     Who are your caregiver(s) and their phone number(s)? Bianka Copeland (Spouse)     Prior to hospitilization cognitive status: Alert/Oriented     Current cognitive status: Alert/Oriented     Walking or Climbing Stairs Difficulty none     Dressing/Bathing Difficulty none     Home Accessibility wheelchair accessible     Home Layout Able to live on 1st floor     Equipment  Currently Used at Home none     Readmission within 30 days? No     Patient currently being followed by outpatient case management? No     Do you currently have service(s) that help you manage your care at home? No     Do you take prescription medications? Yes     Do you have prescription coverage? Yes     Coverage Commercial     Do you have any problems affording any of your prescribed medications? No     Is the patient taking medications as prescribed? yes     Who is going to help you get home at discharge? Bianka Copeland (Spouse)     How do you get to doctors appointments? car, drives self;family or friend will provide     Are you on dialysis? No     Do you take coumadin? No     Discharge Plan A Home with family     DME Needed Upon Discharge  none     Discharge Plan discussed with: Patient     Discharge Barriers Identified None        Relationship/Environment    Name(s) of Who Lives With Patient Bianka Copeland (Spouse)               Anticipated DC dispo: home with family   Prior Level of Function: independent, lives with spouse   PCP: Anam Andrews MD    Comments:  CM met with patient at bedside to introduce role and discuss discharge planning. Patient lives with his spouse who will also be help at home and can provide transport at time of discharge. CM discharge needs depends on hospital progress. CM will continue following to assist with other needs.

## 2022-08-08 NOTE — SUBJECTIVE & OBJECTIVE
Interval History: Temp up to 102 last night. CK trending down slowly - continue generous IVF's and IV rocephin. U/a with cx for high risk - follow cx. Continue to trend CPK and fever.    Review of Systems   Constitutional:  Positive for fatigue. Negative for appetite change, chills and unexpected weight change.   HENT:  Negative for congestion, mouth sores, sinus pressure, sore throat and trouble swallowing.    Eyes:  Negative for photophobia and visual disturbance.   Respiratory:  Negative for cough, shortness of breath and wheezing.    Cardiovascular:  Negative for chest pain, palpitations and leg swelling.   Gastrointestinal:  Negative for abdominal distention and abdominal pain.   Endocrine: Negative for polydipsia, polyphagia and polyuria.   Genitourinary:  Negative for flank pain, frequency and hematuria.   Musculoskeletal:  Negative for back pain and neck stiffness.   Skin:  Negative for pallor and rash.   Allergic/Immunologic: Negative for immunocompromised state.   Neurological:  Negative for dizziness, tremors, seizures, syncope, speech difficulty, weakness and headaches.   Hematological:  Negative for adenopathy. Does not bruise/bleed easily.   Psychiatric/Behavioral:  Negative for agitation, confusion and suicidal ideas.    Objective:     Vital Signs (Most Recent):  Temp: 98.3 °F (36.8 °C) (08/08/22 0729)  Pulse: 79 (08/08/22 0919)  Resp: 18 (08/08/22 0729)  BP: 124/77 (08/08/22 0729)  SpO2: 97 % (08/08/22 0729) Vital Signs (24h Range):  Temp:  [97.9 °F (36.6 °C)-102.1 °F (38.9 °C)] 98.3 °F (36.8 °C)  Pulse:  [55-95] 79  Resp:  [13-22] 18  SpO2:  [94 %-100 %] 97 %  BP: (121-154)/(58-94) 124/77     Weight: 114.3 kg (251 lb 15.8 oz)  Body mass index is 30.67 kg/m².    Intake/Output Summary (Last 24 hours) at 8/8/2022 1307  Last data filed at 8/8/2022 0900  Gross per 24 hour   Intake 578.23 ml   Output 2300 ml   Net -1721.77 ml      Physical Exam  Constitutional:       Appearance: Normal appearance. He is  obese.   HENT:      Head: Normocephalic and atraumatic.      Nose: No congestion or rhinorrhea.      Mouth/Throat:      Pharynx: No posterior oropharyngeal erythema.   Eyes:      General: No scleral icterus.     Extraocular Movements: Extraocular movements intact.      Pupils: Pupils are equal, round, and reactive to light.   Neck:      Vascular: No carotid bruit.   Cardiovascular:      Rate and Rhythm: Normal rate and regular rhythm.   Pulmonary:      Effort: Pulmonary effort is normal. No respiratory distress.      Breath sounds: No stridor. Rhonchi present. No wheezing.   Abdominal:      General: There is no distension.      Palpations: Abdomen is soft.      Tenderness: There is no abdominal tenderness. There is no guarding.   Genitourinary:     Comments: deferred  Musculoskeletal:         General: No swelling or deformity. Normal range of motion.      Cervical back: Normal range of motion and neck supple. No rigidity.   Skin:     General: Skin is warm and dry.      Capillary Refill: Capillary refill takes less than 2 seconds.      Coloration: Skin is not jaundiced.      Findings: No erythema or rash.   Neurological:      Mental Status: He is alert and oriented to person, place, and time.      Motor: No weakness.   Psychiatric:         Mood and Affect: Mood normal.         Behavior: Behavior normal.         Thought Content: Thought content normal.         Judgment: Judgment normal.       Significant Labs: All pertinent labs within the past 24 hours have been reviewed.  Recent Lab Results  (Last 5 results in the past 24 hours)        08/08/22  0348   08/08/22  0005   08/07/22  2116   08/07/22  1329   08/07/22  1311        Amorphous, UA       Occasional         Anion Gap 10               Appearance, UA       Clear         Bacteria, UA       Moderate         Baso # 0.02               Basophil % 0.5               Bilirubin (UA)       Negative         Blood Culture, Routine         No Growth to date  [P]       BUN  10               Calcium 8.5               Chloride 101               CO2 25               Color, UA       Yellow         CPK   1530   1708           Creatinine 0.9               Differential Method Automated               eGFR >60               Eos # 0.0               Eosinophil % 0.5               Estimated Avg Glucose 120               Glucose 93               Glucose, UA       Negative         Gran # (ANC) 1.6               Gran % 43.2               Hematocrit 35.7               Hemoglobin 11.7               Hemoglobin A1C External 5.8  Comment: ADA Screening Guidelines:  5.7-6.4%  Consistent with prediabetes  >or=6.5%  Consistent with diabetes    High levels of fetal hemoglobin interfere with the HbA1C  assay. Heterozygous hemoglobin variants (HbS, HgC, etc)do  not significantly interfere with this assay.   However, presence of multiple variants may affect accuracy.                 Immature Grans (Abs) 0.01  Comment: Mild elevation in immature granulocytes is non specific and   can be seen in a variety of conditions including stress response,   acute inflammation, trauma and pregnancy. Correlation with other   laboratory and clinical findings is essential.                 Immature Granulocytes 0.3               Ketones, UA       Negative         Leukocytes, UA       Negative         Lymph # 1.2               Lymph % 32.1               MCH 27.0               MCHC 32.8               MCV 82               Microscopic Comment       SEE COMMENT  Comment: Other formed elements not mentioned in the report are not   present in the microscopic examination.            Mono # 0.9               Mono % 23.4               MPV 11.1               NITRITE UA       Negative         nRBC 0               Occult Blood UA       1+         pH, UA       6.0         Platelets 225               Potassium 4.1               Protein, UA       Negative  Comment: Recommend a 24 hour urine protein or a urine   protein/creatinine ratio if  globulin induced proteinuria is  clinically suspected.           RBC 4.33               RBC, UA       20         RDW 14.0               Sodium 136               Specific [place], UA       1.005         Specimen UA       Urine, Clean Catch         Squam Epithel, UA       2         UROBILINOGEN UA       Negative         WBC, UA       5         WBC 3.68                                       [P] - Preliminary Result               Significant Imaging: I have reviewed all pertinent imaging results/findings within the past 24 hours.

## 2022-08-08 NOTE — ASSESSMENT & PLAN NOTE
>2170  IVF hydration  Repeat in the AM  08/08:  --trending down slowly  --continue generous IVF's  --repeat CPK in AM

## 2022-08-08 NOTE — HOSPITAL COURSE
Patient was kept on OBS for pneumonia under the care of hospital medicine. He was started on IV fluids and IV rocephin.  08/08: Temp up to 102 last night. CK trending down slowly - continue generous IVF's and IV rocephin. U/a with cx for high risk - follow cx. Continue to trend CPK and fever.  CK trended toward normal.  Respiratory status improved.  Breathing easier and no shortness of breath on exertion.  Remained fever free last 24 hours.  Discharge plan to return home and complete a course of antibiotics.  Follow up with primary care.

## 2022-08-08 NOTE — CHAPLAIN
Initial visit with patient.  Visited with patient to assess for spiritual and emotional needs.  Pt was doing okay at the time of my visit but did want me to pray for him.  I prayed for him before leaving and spiritual care remains available as needed.    Chaplain Kendall Che M.Div., [facility]

## 2022-08-08 NOTE — PROGRESS NOTES
[facility] Medicine  Progress Note    Patient Name: Braulio Copeland  MRN: [id number]  Patient Class: IP- Inpatient   Admission Date: 8/7/2022  Length of Stay: 0 days  Attending Physician: Cali Riley MD  Primary Care Provider: Anam Andrews MD        Subjective:     Principal Problem:Pneumonia        HPI:  Braulio Copeland is a 57 y.o. male presenting to the ED for chills and sweating a lot.  Patient was seen in the ED on August 5, 2022. Patient was diagnosed with UTI and prescribed ciprofloxacin.  Patient feels like he is dehydrated after working out in the heat.  Patient has not had a fever since the visit in the ED.  He notes that his throat feels dry.  Patient is having episodes of weakness.  It is not associated with any chest pain, chest pressure, shortness of breath, difficulty breathing, nausea, vomiting, diarrhea, blood in stool, black tarry stool, headache, or rash.  In the ED: CXR revealed PNA in right lung base. Lactic acid negative. Procal 0.31, >2170. Transferred to [facility] for management  HM contacted for OBS admission.       Overview/Hospital Course:  Patient was kept on OBS for pneumonia under the care of hospital medicine. He was started on IV fluids and IV rocephin.      Interval History: Temp up to 102 last night. CK trending down slowly - continue generous IVF's and IV rocephin. U/a with cx for high risk - follow cx. Continue to trend CPK and fever.    Review of Systems   Constitutional:  Positive for fatigue. Negative for appetite change, chills and unexpected weight change.   HENT:  Negative for congestion, mouth sores, sinus pressure, sore throat and trouble swallowing.    Eyes:  Negative for photophobia and visual disturbance.   Respiratory:  Negative for cough, shortness of breath and wheezing.    Cardiovascular:  Negative for chest pain, palpitations and leg swelling.   Gastrointestinal:  Negative for abdominal distention and abdominal pain.    Endocrine: Negative for polydipsia, polyphagia and polyuria.   Genitourinary:  Negative for flank pain, frequency and hematuria.   Musculoskeletal:  Negative for back pain and neck stiffness.   Skin:  Negative for pallor and rash.   Allergic/Immunologic: Negative for immunocompromised state.   Neurological:  Negative for dizziness, tremors, seizures, syncope, speech difficulty, weakness and headaches.   Hematological:  Negative for adenopathy. Does not bruise/bleed easily.   Psychiatric/Behavioral:  Negative for agitation, confusion and suicidal ideas.    Objective:     Vital Signs (Most Recent):  Temp: 98.3 °F (36.8 °C) (08/08/22 0729)  Pulse: 79 (08/08/22 0919)  Resp: 18 (08/08/22 0729)  BP: 124/77 (08/08/22 0729)  SpO2: 97 % (08/08/22 0729) Vital Signs (24h Range):  Temp:  [97.9 °F (36.6 °C)-102.1 °F (38.9 °C)] 98.3 °F (36.8 °C)  Pulse:  [55-95] 79  Resp:  [13-22] 18  SpO2:  [94 %-100 %] 97 %  BP: (121-154)/(58-94) 124/77     Weight: 114.3 kg (251 lb 15.8 oz)  Body mass index is 30.67 kg/m².    Intake/Output Summary (Last 24 hours) at 8/8/2022 1307  Last data filed at 8/8/2022 0900  Gross per 24 hour   Intake 578.23 ml   Output 2300 ml   Net -1721.77 ml      Physical Exam  Constitutional:       Appearance: Normal appearance. He is obese.   HENT:      Head: Normocephalic and atraumatic.      Nose: No congestion or rhinorrhea.      Mouth/Throat:      Pharynx: No posterior oropharyngeal erythema.   Eyes:      General: No scleral icterus.     Extraocular Movements: Extraocular movements intact.      Pupils: Pupils are equal, round, and reactive to light.   Neck:      Vascular: No carotid bruit.   Cardiovascular:      Rate and Rhythm: Normal rate and regular rhythm.   Pulmonary:      Effort: Pulmonary effort is normal. No respiratory distress.      Breath sounds: No stridor. Rhonchi present. No wheezing.   Abdominal:      General: There is no distension.      Palpations: Abdomen is soft.      Tenderness: There is no  abdominal tenderness. There is no guarding.   Genitourinary:     Comments: deferred  Musculoskeletal:         General: No swelling or deformity. Normal range of motion.      Cervical back: Normal range of motion and neck supple. No rigidity.   Skin:     General: Skin is warm and dry.      Capillary Refill: Capillary refill takes less than 2 seconds.      Coloration: Skin is not jaundiced.      Findings: No erythema or rash.   Neurological:      Mental Status: He is alert and oriented to person, place, and time.      Motor: No weakness.   Psychiatric:         Mood and Affect: Mood normal.         Behavior: Behavior normal.         Thought Content: Thought content normal.         Judgment: Judgment normal.       Significant Labs: All pertinent labs within the past 24 hours have been reviewed.  Recent Lab Results  (Last 5 results in the past 24 hours)        08/08/22  0348   08/08/22  0005   08/07/22  2116   08/07/22  1329   08/07/22  1311        Amorphous, UA       Occasional         Anion Gap 10               Appearance, UA       Clear         Bacteria, UA       Moderate         Baso # 0.02               Basophil % 0.5               Bilirubin (UA)       Negative         Blood Culture, Routine         No Growth to date  [P]       BUN 10               Calcium 8.5               Chloride 101               CO2 25               Color, UA       Yellow         CPK   1530   1708           Creatinine 0.9               Differential Method Automated               eGFR >60               Eos # 0.0               Eosinophil % 0.5               Estimated Avg Glucose 120               Glucose 93               Glucose, UA       Negative         Gran # (ANC) 1.6               Gran % 43.2               Hematocrit 35.7               Hemoglobin 11.7               Hemoglobin A1C External 5.8  Comment: ADA Screening Guidelines:  5.7-6.4%  Consistent with prediabetes  >or=6.5%  Consistent with diabetes    High levels of fetal hemoglobin  interfere with the HbA1C  assay. Heterozygous hemoglobin variants (HbS, HgC, etc)do  not significantly interfere with this assay.   However, presence of multiple variants may affect accuracy.                 Immature Grans (Abs) 0.01  Comment: Mild elevation in immature granulocytes is non specific and   can be seen in a variety of conditions including stress response,   acute inflammation, trauma and pregnancy. Correlation with other   laboratory and clinical findings is essential.                 Immature Granulocytes 0.3               Ketones, UA       Negative         Leukocytes, UA       Negative         Lymph # 1.2               Lymph % 32.1               MCH 27.0               MCHC 32.8               MCV 82               Microscopic Comment       SEE COMMENT  Comment: Other formed elements not mentioned in the report are not   present in the microscopic examination.            Mono # 0.9               Mono % 23.4               MPV 11.1               NITRITE UA       Negative         nRBC 0               Occult Blood UA       1+         pH, UA       6.0         Platelets 225               Potassium 4.1               Protein, UA       Negative  Comment: Recommend a 24 hour urine protein or a urine   protein/creatinine ratio if globulin induced proteinuria is  clinically suspected.           RBC 4.33               RBC, UA       20         RDW 14.0               Sodium 136               Specific [place], UA       1.005         Specimen UA       Urine, Clean Catch         Squam Epithel, UA       2         UROBILINOGEN UA       Negative         WBC, UA       5         WBC 3.68                                       [P] - Preliminary Result               Significant Imaging: I have reviewed all pertinent imaging results/findings within the past 24 hours.      Assessment/Plan:      * Pneumonia  As evidenced by CXR  Rocephin and Steve initiated  procal 0.31  Lactic acid negative  Should be able to d/c in the am   PSI  score: 0.6-0.9% mortality  08/08  --temp 102 overnight  --continue IV rocephin      Anxiety  PRN Vistaril      Non-traumatic rhabdomyolysis  >2170  IVF hydration  Repeat in the AM  08/08:  --trending down slowly  --continue generous IVF's  --repeat CPK in AM    Urinary tract infection with hematuria  Rocephin initiated  BC x2 drawn  Urine Cx pending   Lactic acid negative  IVF hydration   08/08:  --u/a with cx for high risk  --continue rocephin  --follow cx        VTE Risk Mitigation (From admission, onward)         Ordered     enoxaparin injection 40 mg  Daily         08/07/22 1754     IP VTE HIGH RISK PATIENT  Once         08/07/22 1754     Place sequential compression device  Until discontinued         08/07/22 1754                Discharge Planning   GURINDER:      Code Status: Full Code   Is the patient medically ready for discharge?:     Reason for patient still in hospital (select all that apply): Patient trending condition and Treatment                     GEOVANNI McclendonC  Department of Hospital Medicine   [facility] - Telemetry ([facility])

## 2022-08-08 NOTE — ASSESSMENT & PLAN NOTE
As evidenced by CXR  Rocephin and Azithro initiated  procal 0.31  Lactic acid negative  Should be able to d/c in the am   PSI score: 0.6-0.9% mortality  08/08  --temp 102 overnight  --continue IV rocephin

## 2022-08-08 NOTE — ASSESSMENT & PLAN NOTE
Rocephin initiated  BC x2 drawn  Urine Cx pending   Lactic acid negative  IVF hydration   08/08:  --u/a with cx for high risk  --continue rocephin  --follow cx

## 2022-08-09 VITALS
RESPIRATION RATE: 16 BRPM | DIASTOLIC BLOOD PRESSURE: 81 MMHG | WEIGHT: 248.88 LBS | OXYGEN SATURATION: 98 % | HEIGHT: 76 IN | TEMPERATURE: 98 F | BODY MASS INDEX: 30.31 KG/M2 | HEART RATE: 86 BPM | SYSTOLIC BLOOD PRESSURE: 135 MMHG

## 2022-08-09 LAB
ANION GAP SERPL CALC-SCNC: 9 MMOL/L (ref 8–16)
BASOPHILS # BLD AUTO: 0.04 K/UL (ref 0–0.2)
BASOPHILS NFR BLD: 1.2 % (ref 0–1.9)
BUN SERPL-MCNC: 8 MG/DL (ref 6–20)
CALCIUM SERPL-MCNC: 8.4 MG/DL (ref 8.7–10.5)
CHLORIDE SERPL-SCNC: 106 MMOL/L (ref 95–110)
CO2 SERPL-SCNC: 27 MMOL/L (ref 23–29)
CREAT SERPL-MCNC: 0.8 MG/DL (ref 0.5–1.4)
DIFFERENTIAL METHOD: ABNORMAL
EOSINOPHIL # BLD AUTO: 0.1 K/UL (ref 0–0.5)
EOSINOPHIL NFR BLD: 3.1 % (ref 0–8)
ERYTHROCYTE [DISTWIDTH] IN BLOOD BY AUTOMATED COUNT: 14.3 % (ref 11.5–14.5)
EST. GFR  (NO RACE VARIABLE): >60 ML/MIN/1.73 M^2
GLUCOSE SERPL-MCNC: 105 MG/DL (ref 70–110)
HCT VFR BLD AUTO: 37.6 % (ref 40–54)
HGB BLD-MCNC: 11.6 G/DL (ref 14–18)
IMM GRANULOCYTES # BLD AUTO: 0.01 K/UL (ref 0–0.04)
IMM GRANULOCYTES NFR BLD AUTO: 0.3 % (ref 0–0.5)
LYMPHOCYTES # BLD AUTO: 1.3 K/UL (ref 1–4.8)
LYMPHOCYTES NFR BLD: 40.8 % (ref 18–48)
MCH RBC QN AUTO: 26.1 PG (ref 27–31)
MCHC RBC AUTO-ENTMCNC: 30.9 G/DL (ref 32–36)
MCV RBC AUTO: 85 FL (ref 82–98)
MONOCYTES # BLD AUTO: 0.7 K/UL (ref 0.3–1)
MONOCYTES NFR BLD: 20.9 % (ref 4–15)
NEUTROPHILS # BLD AUTO: 1.1 K/UL (ref 1.8–7.7)
NEUTROPHILS NFR BLD: 33.7 % (ref 38–73)
NRBC BLD-RTO: 0 /100 WBC
PLATELET # BLD AUTO: 286 K/UL (ref 150–450)
PMV BLD AUTO: 10.2 FL (ref 9.2–12.9)
POTASSIUM SERPL-SCNC: 3.8 MMOL/L (ref 3.5–5.1)
RBC # BLD AUTO: 4.44 M/UL (ref 4.6–6.2)
SODIUM SERPL-SCNC: 142 MMOL/L (ref 136–145)
WBC # BLD AUTO: 3.26 K/UL (ref 3.9–12.7)

## 2022-08-09 PROCEDURE — 25000003 PHARM REV CODE 250: Performed by: NURSE PRACTITIONER

## 2022-08-09 PROCEDURE — 63600175 PHARM REV CODE 636 W HCPCS: Performed by: NURSE PRACTITIONER

## 2022-08-09 PROCEDURE — 63700000 PHARM REV CODE 250 ALT 637 W/O HCPCS: Performed by: EMERGENCY MEDICINE

## 2022-08-09 PROCEDURE — 36415 COLL VENOUS BLD VENIPUNCTURE: CPT | Performed by: NURSE PRACTITIONER

## 2022-08-09 PROCEDURE — 80048 BASIC METABOLIC PNL TOTAL CA: CPT | Performed by: NURSE PRACTITIONER

## 2022-08-09 PROCEDURE — 85025 COMPLETE CBC W/AUTO DIFF WBC: CPT | Performed by: NURSE PRACTITIONER

## 2022-08-09 PROCEDURE — 63600175 PHARM REV CODE 636 W HCPCS: Performed by: EMERGENCY MEDICINE

## 2022-08-09 PROCEDURE — A4216 STERILE WATER/SALINE, 10 ML: HCPCS | Performed by: NURSE PRACTITIONER

## 2022-08-09 RX ORDER — AMOXICILLIN AND CLAVULANATE POTASSIUM 875; 125 MG/1; MG/1
1 TABLET, FILM COATED ORAL EVERY 12 HOURS
Qty: 8 TABLET | Refills: 0 | Status: SHIPPED | OUTPATIENT
Start: 2022-08-09 | End: 2022-08-13

## 2022-08-09 RX ORDER — AZITHROMYCIN 250 MG/1
250 TABLET, FILM COATED ORAL DAILY
Qty: 4 TABLET | Refills: 0 | Status: SHIPPED | OUTPATIENT
Start: 2022-08-10

## 2022-08-09 RX ADMIN — SODIUM CHLORIDE: 0.9 INJECTION, SOLUTION INTRAVENOUS at 04:08

## 2022-08-09 RX ADMIN — SODIUM CHLORIDE: 0.9 INJECTION, SOLUTION INTRAVENOUS at 11:08

## 2022-08-09 RX ADMIN — ENOXAPARIN SODIUM 40 MG: 100 INJECTION SUBCUTANEOUS at 05:08

## 2022-08-09 RX ADMIN — SENNOSIDES AND DOCUSATE SODIUM 1 TABLET: 8.6; 5 TABLET ORAL at 08:08

## 2022-08-09 RX ADMIN — CEFTRIAXONE 1 G: 1 INJECTION, SOLUTION INTRAVENOUS at 03:08

## 2022-08-09 RX ADMIN — Medication 10 ML: at 05:08

## 2022-08-09 RX ADMIN — AZITHROMYCIN MONOHYDRATE 250 MG: 250 TABLET ORAL at 08:08

## 2022-08-09 NOTE — PLAN OF CARE
"Care plan reviewed with patient. Able to get out of bed and walk to the bathroom unassisted. Verbalized "I am ready to go home." Free from falls. No other complaints made.   "

## 2022-08-09 NOTE — PLAN OF CARE
Pt AAOx4. No signs of distress. Able to verbalize needs and follow commands. Calm and Cooperative. Denies any pain at this time. Vital signs stable. Sinus Rhythm on tele monitor. On room air.  PIV is patent. ABT therapy in progress. No adverse reactions noted. Pt continent of b/b. independent. Pt remains free of falls. Meds given as prescribed.  POC reviewed with pt and pt verbalized understanding. Pt educated on safety and fall risk. Verbalized understanding. Interventions implemented as appropriate. Pt able to turn self. Encouraged pt  to turn frequently. Resting quietly in bed.Bed low. Side rails up x2, wheels locked, call light within reach.

## 2022-08-10 ENCOUNTER — PATIENT OUTREACH (OUTPATIENT)
Dept: ADMINISTRATIVE | Facility: CLINIC | Age: 57
End: 2022-08-10
Payer: COMMERCIAL

## 2022-08-10 LAB — BACTERIA UR CULT: NO GROWTH

## 2022-08-10 NOTE — NURSING
Discharge summary, health teachings and instructions given to patient with spouse at bedside--verbalized understanding. IV removed-no complications noted. Tele monitor removed and returned to the monitor room. All questions answered with regards to discharge instructions. Reminded of the importance of follow-up appointments.  Left the unit ambulatory as preferred  with personal belongings at hand accompanied by spouse.

## 2022-08-10 NOTE — PROGRESS NOTES
C3 nurse spoke with Braulio Copeland for a TCC post hospital discharge follow up call. The patient reports does not have a scheduled HOSFU appointment. C3 nurse was unable to schedule HOSFU appointment for Non[facility] PCP. Patient advised to contact their PCP to schedule a HOSPFU within 5-7 days.

## 2022-08-10 NOTE — PROGRESS NOTES
C3 nurse attempted to contact Braulio Copeland for a TCC post hospital discharge follow up call. No answer. The patient does not have a scheduled HOSFU appointment, and the pt does not have an [facility] PCP.

## 2022-08-11 NOTE — DISCHARGE SUMMARY
[facility]  [facility]  Discharge Summary      Patient Name: Braulio Copeland  MRN: [id number]  Patient Class: IP- Inpatient  Admission Date: 8/7/2022  Hospital Length of Stay: 1 days  Discharge Date and Time: 8/9/2022  7:22 PM  Attending Physician:  Cali Riley MD  Discharging Provider: Cali Riley MD  Primary Care Provider: Anam Andrews MD      HPI:   Braulio Copeland is a 57 y.o. male presenting to the ED for chills and sweating a lot.  Patient was seen in the ED on August 5, 2022. Patient was diagnosed with UTI and prescribed ciprofloxacin.  Patient feels like he is dehydrated after working out in the heat.  Patient has not had a fever since the visit in the ED.  He notes that his throat feels dry.  Patient is having episodes of weakness.  It is not associated with any chest pain, chest pressure, shortness of breath, difficulty breathing, nausea, vomiting, diarrhea, blood in stool, black tarry stool, headache, or rash.  In the ED: CXR revealed PNA in right lung base. Lactic acid negative. Procal 0.31, >2170. Transferred to [facility] for management  HM contacted for OBS admission.       * No surgery found *      Hospital Course:   Patient was kept on OBS for pneumonia under the care of hospital medicine. He was started on IV fluids and IV rocephin.  08/08: Temp up to 102 last night. CK trending down slowly - continue generous IVF's and IV rocephin. U/a with cx for high risk - follow cx. Continue to trend CPK and fever.  CK trended toward normal.  Respiratory status improved.  Breathing easier and no shortness of breath on exertion.  Remained fever free last 24 hours.  Discharge plan to return home and complete a course of antibiotics.  Follow up with primary care.       Goals of Care Treatment Preferences:  Code Status: Full Code      Consults:     No new Assessment & Plan notes have been filed under this hospital service since the last note was generated.  Service:  Hospital Medicine    Final Active Diagnoses:    Diagnosis Date Noted POA    PRINCIPAL PROBLEM:  Pneumonia [J18.9] 08/07/2022 Yes    Urinary tract infection with hematuria [N39.0, R31.9] 08/07/2022 Yes    Non-traumatic rhabdomyolysis [M62.82] 08/07/2022 Yes    Anxiety [F41.9] 08/07/2022 Yes      Problems Resolved During this Admission:       Discharged Condition: good    Disposition: Home or Self Care    Follow Up:   Follow-up Information     Anam Andrews MD Follow up in 1 week(s).    Specialty: Family Medicine  Why: Hospital follow up pneumonia.  Contact information:  [address] [place] [address]  [phone]                       Patient Instructions:      Diet Cardiac     Activity as tolerated       Significant Diagnostic Studies: Labs: All labs within the past 24 hours have been reviewed    Pending Diagnostic Studies:     None         Medications:  Reconciled Home Medications:      Medication List      START taking these medications    amoxicillin-clavulanate 875-125mg 875-125 mg per tablet  Commonly known as: AUGMENTIN  Take 1 tablet by mouth every 12 (twelve) hours. for 4 days     azithromycin 250 MG tablet  Commonly known as: Z-MIRANDA  Take 1 tablet (250 mg total) by mouth once daily.        CONTINUE taking these medications    omeprazole 20 MG capsule  Commonly known as: PRILOSEC  Take 1 capsule (20 mg total) by mouth once daily. for 14 days        STOP taking these medications    ciprofloxacin HCl 500 MG tablet  Commonly known as: CIPRO            Indwelling Lines/Drains at time of discharge:   Lines/Drains/Airways     None                 Time spent on the discharge of patient: 30 minutes         Cali Riley MD  Department of Hospital Medicine  [facility] ([facility])

## 2022-08-13 LAB
BACTERIA BLD CULT: NORMAL
BACTERIA BLD CULT: NORMAL

## 2022-12-19 ENCOUNTER — HOSPITAL ENCOUNTER (EMERGENCY)
Facility: HOSPITAL | Age: 57
Discharge: HOME | End: 2022-12-19
Attending: EMERGENCY MEDICINE
Payer: COMMERCIAL

## 2022-12-19 VITALS
WEIGHT: 262.56 LBS | TEMPERATURE: 98 F | SYSTOLIC BLOOD PRESSURE: 151 MMHG | OXYGEN SATURATION: 98 % | RESPIRATION RATE: 18 BRPM | BODY MASS INDEX: 31.96 KG/M2 | HEART RATE: 84 BPM | DIASTOLIC BLOOD PRESSURE: 72 MMHG

## 2022-12-19 DIAGNOSIS — H16.133 UV KERATITIS, BILATERAL: Primary | ICD-10-CM

## 2022-12-19 PROCEDURE — 99284 EMERGENCY DEPT VISIT MOD MDM: CPT | Mod: ER

## 2022-12-19 PROCEDURE — 25000003 PHARM REV CODE 250: Mod: ER | Performed by: EMERGENCY MEDICINE

## 2022-12-19 RX ORDER — OXYCODONE AND ACETAMINOPHEN 10; 325 MG/1; MG/1
1 TABLET ORAL
Status: COMPLETED | OUTPATIENT
Start: 2022-12-19 | End: 2022-12-19

## 2022-12-19 RX ORDER — HYDROCODONE BITARTRATE AND ACETAMINOPHEN 10; 325 MG/1; MG/1
1 TABLET ORAL EVERY 6 HOURS PRN
Qty: 12 TABLET | Refills: 0 | Status: SHIPPED | OUTPATIENT
Start: 2022-12-19

## 2022-12-19 RX ORDER — ERYTHROMYCIN 5 MG/G
OINTMENT OPHTHALMIC
Status: COMPLETED | OUTPATIENT
Start: 2022-12-19 | End: 2022-12-19

## 2022-12-19 RX ORDER — ERYTHROMYCIN 5 MG/G
OINTMENT OPHTHALMIC
Qty: 3.5 G | Refills: 0 | Status: SHIPPED | OUTPATIENT
Start: 2022-12-19

## 2022-12-19 RX ADMIN — OXYCODONE AND ACETAMINOPHEN 1 TABLET: 10; 325 TABLET ORAL at 05:12

## 2022-12-19 RX ADMIN — ERYTHROMYCIN 1 INCH: 5 OINTMENT OPHTHALMIC at 05:12

## 2022-12-19 RX ADMIN — FLUORESCEIN SODIUM AND BENOXINATE HYDROCHLORIDE 2 DROP: 4; 2.5 SOLUTION OPHTHALMIC at 05:12

## 2022-12-19 NOTE — ED PROVIDER NOTES
Encounter Date: 12/19/2022       History     Chief Complaint   Patient presents with    Eye Pain     Bilateral welding flash yesterday.      The history is provided by the patient.   Eye Pain   This is a new problem. The current episode started yesterday. The problem occurs constantly. The problem has been unchanged. Both eyes are affected. The injury mechanism was a welding arc exposure. The pain is at a severity of 9/10. There is No history of trauma to the eye. There is No known exposure to pink eye. He Does not wear contacts. Associated symptoms include photophobia and eye redness. Pertinent negatives include no numbness, no blurred vision, no decreased vision, no discharge, no double vision, no foreign body sensation, no nausea, no vomiting, no tingling, no weakness and no itching. He has tried nothing for the symptoms.     Review of patient's allergies indicates:  No Known Allergies  Past Medical History:   Diagnosis Date    Back pain      Past Surgical History:   Procedure Laterality Date    KNEE SURGERY Left      Family History   Problem Relation Age of Onset    Hypertension Unknown      Social History     Tobacco Use    Smoking status: Never    Smokeless tobacco: Never   Substance Use Topics    Alcohol use: No    Drug use: No     Review of Systems   Constitutional:  Negative for fever.   HENT:  Negative for congestion.    Eyes:  Positive for photophobia, pain and redness. Negative for blurred vision, double vision and discharge.   Respiratory:  Negative for cough.    Cardiovascular:  Negative for chest pain.   Gastrointestinal:  Negative for nausea and vomiting.   Skin:  Negative for itching.   Neurological:  Negative for tingling, weakness and numbness.   Hematological:  Does not bruise/bleed easily.     Physical Exam     Initial Vitals [12/19/22 0510]   BP Pulse Resp Temp SpO2   (!) 151/72 84 18 97.9 °F (36.6 °C) 98 %      MAP       --         Physical Exam    Nursing note and vitals  reviewed.  Constitutional: He appears well-developed and well-nourished.   HENT:   Head: Normocephalic and atraumatic.   Mouth/Throat: Oropharynx is clear and moist.   Eyes: EOM and lids are normal. Pupils are equal, round, and reactive to light. No foreign body present in the right eye. No foreign body present in the left eye. Right conjunctiva is injected. Left conjunctiva is injected.   Neck: Neck supple.   Normal range of motion.  Cardiovascular:  Normal rate, regular rhythm and normal heart sounds.           Pulmonary/Chest: Breath sounds normal.   Abdominal: Abdomen is soft. Bowel sounds are normal.   Musculoskeletal:         General: Normal range of motion.      Cervical back: Normal range of motion and neck supple.     Neurological: He is alert and oriented to person, place, and time. He has normal strength.   Skin: Skin is warm and dry.   Psychiatric: He has a normal mood and affect. Thought content normal.       ED Course   Procedures  Labs Reviewed - No data to display       Imaging Results    None     5:32 AM - Counseling: Spoke with the patient and discussed todays findings, in addition to providing specific details for the plan of care and counseling regarding the diagnosis and prognosis. Questions are answered at this time.       Medications   fluorescein-benoxinate 0.25-0.4% ophthalmic solution 2 drop (2 drops Both Eyes Given 12/19/22 0519)   oxyCODONE-acetaminophen  mg per tablet 1 tablet (1 tablet Oral Given 12/19/22 0522)   erythromycin 5 mg/gram (0.5 %) ophthalmic ointment (1 inch Both Eyes Given 12/19/22 0527)                              Clinical Impression:   Final diagnoses:  [H16.133] UV keratitis, bilateral (Primary)        ED Disposition Condition    Discharge Stable          ED Prescriptions       Medication Sig Dispense Start Date End Date Auth. Provider    erythromycin (ROMYCIN) ophthalmic ointment Place a 1/2 inch ribbon of ointment into the lower eyelid bilaterally qid for 5  days 3.5 g 12/19/2022 -- Lencho Deuñas MD    HYDROcodone-acetaminophen (NORCO)  mg per tablet Take 1 tablet by mouth every 6 (six) hours as needed for Pain. 12 tablet 12/19/2022 -- Lencho Dueñas MD          Follow-up Information       Follow up With Specialties Details Why Contact Info    Guillermo Ye MD Ophthalmology, Surgery Call in 2 days Eye doctor [id number]  [place] [id number] [phone]      Anam Andrews MD Family Medicine In 2 days  [address] [place] [id number] [phone]      [facility] - Emergency Dept Emergency Medicine  If symptoms worsen [id number] [address] [address] [phone]             Lencho Dueñas MD  12/19/22 2513

## 2023-06-11 ENCOUNTER — HOSPITAL ENCOUNTER (EMERGENCY)
Facility: HOSPITAL | Age: 58
Discharge: HOME | End: 2023-06-12
Attending: EMERGENCY MEDICINE
Payer: COMMERCIAL

## 2023-06-11 DIAGNOSIS — R06.02 SOB (SHORTNESS OF BREATH): ICD-10-CM

## 2023-06-11 DIAGNOSIS — R10.33 PERIUMBILICAL ABDOMINAL PAIN: Primary | ICD-10-CM

## 2023-06-11 PROCEDURE — 99285 EMERGENCY DEPT VISIT HI MDM: CPT | Mod: 25,ER

## 2023-06-11 PROCEDURE — 93010 EKG 12-LEAD: ICD-10-PCS | Mod: ,,, | Performed by: INTERNAL MEDICINE

## 2023-06-11 PROCEDURE — 93005 ELECTROCARDIOGRAM TRACING: CPT | Mod: ER

## 2023-06-11 PROCEDURE — 81003 URINALYSIS AUTO W/O SCOPE: CPT | Mod: ER,59 | Performed by: EMERGENCY MEDICINE

## 2023-06-11 PROCEDURE — 93010 ELECTROCARDIOGRAM REPORT: CPT | Mod: ,,, | Performed by: INTERNAL MEDICINE

## 2023-06-11 PROCEDURE — 80307 DRUG TEST PRSMV CHEM ANLYZR: CPT | Mod: ER | Performed by: EMERGENCY MEDICINE

## 2023-06-11 RX ORDER — LIDOCAINE HYDROCHLORIDE 20 MG/ML
15 SOLUTION OROPHARYNGEAL ONCE
Status: COMPLETED | OUTPATIENT
Start: 2023-06-12 | End: 2023-06-12

## 2023-06-11 RX ORDER — MAG HYDROX/ALUMINUM HYD/SIMETH 200-200-20
30 SUSPENSION, ORAL (FINAL DOSE FORM) ORAL ONCE
Status: COMPLETED | OUTPATIENT
Start: 2023-06-12 | End: 2023-06-12

## 2023-06-11 NOTE — Clinical Note
"Braulio "Brauliolorena Copeland was seen and treated in our emergency department on 6/11/2023.  He may return to work on 06/13/2023.       If you have any questions or concerns, please don't hesitate to call.      REGINALDO Brwon    "

## 2023-06-12 VITALS
RESPIRATION RATE: 19 BRPM | OXYGEN SATURATION: 99 % | TEMPERATURE: 98 F | WEIGHT: 249.13 LBS | DIASTOLIC BLOOD PRESSURE: 71 MMHG | SYSTOLIC BLOOD PRESSURE: 132 MMHG | HEIGHT: 76 IN | BODY MASS INDEX: 30.34 KG/M2 | HEART RATE: 88 BPM

## 2023-06-12 LAB
ALBUMIN SERPL BCP-MCNC: 4.3 G/DL (ref 3.5–5.2)
ALP SERPL-CCNC: 116 U/L (ref 55–135)
ALT SERPL W/O P-5'-P-CCNC: 31 U/L (ref 10–44)
AMPHET+METHAMPHET UR QL: NEGATIVE
ANION GAP SERPL CALC-SCNC: 12 MMOL/L (ref 8–16)
AST SERPL-CCNC: 16 U/L (ref 10–40)
BARBITURATES UR QL SCN>200 NG/ML: NEGATIVE
BASOPHILS # BLD AUTO: 0.06 K/UL (ref 0–0.2)
BASOPHILS NFR BLD: 1 % (ref 0–1.9)
BENZODIAZ UR QL SCN>200 NG/ML: NEGATIVE
BILIRUB SERPL-MCNC: 0.2 MG/DL (ref 0.1–1)
BILIRUB UR QL STRIP: NEGATIVE
BUN SERPL-MCNC: 16 MG/DL (ref 6–20)
BZE UR QL SCN: NEGATIVE
CALCIUM SERPL-MCNC: 9.5 MG/DL (ref 8.7–10.5)
CANNABINOIDS UR QL SCN: NEGATIVE
CHLORIDE SERPL-SCNC: 103 MMOL/L (ref 95–110)
CK SERPL-CCNC: 265 U/L (ref 20–200)
CLARITY UR REFRACT.AUTO: CLEAR
CO2 SERPL-SCNC: 24 MMOL/L (ref 23–29)
COLOR UR AUTO: YELLOW
CREAT SERPL-MCNC: 1.1 MG/DL (ref 0.5–1.4)
CREAT UR-MCNC: 186.9 MG/DL (ref 23–375)
DIFFERENTIAL METHOD: ABNORMAL
EOSINOPHIL # BLD AUTO: 0 K/UL (ref 0–0.5)
EOSINOPHIL NFR BLD: 0.5 % (ref 0–8)
ERYTHROCYTE [DISTWIDTH] IN BLOOD BY AUTOMATED COUNT: 14.4 % (ref 11.5–14.5)
EST. GFR  (NO RACE VARIABLE): >60 ML/MIN/1.73 M^2
GLUCOSE SERPL-MCNC: 125 MG/DL (ref 70–110)
GLUCOSE UR QL STRIP: NEGATIVE
HCT VFR BLD AUTO: 41 % (ref 40–54)
HGB BLD-MCNC: 13.1 G/DL (ref 14–18)
HGB UR QL STRIP: NEGATIVE
IMM GRANULOCYTES # BLD AUTO: 0.01 K/UL (ref 0–0.04)
IMM GRANULOCYTES NFR BLD AUTO: 0.2 % (ref 0–0.5)
KETONES UR QL STRIP: NEGATIVE
LACTATE SERPL-SCNC: 2 MMOL/L (ref 0.5–2.2)
LACTATE SERPL-SCNC: 2.6 MMOL/L (ref 0.5–2.2)
LEUKOCYTE ESTERASE UR QL STRIP: NEGATIVE
LIPASE SERPL-CCNC: 23 U/L (ref 4–60)
LYMPHOCYTES # BLD AUTO: 1.4 K/UL (ref 1–4.8)
LYMPHOCYTES NFR BLD: 24.5 % (ref 18–48)
MCH RBC QN AUTO: 28.2 PG (ref 27–31)
MCHC RBC AUTO-ENTMCNC: 32 G/DL (ref 32–36)
MCV RBC AUTO: 88 FL (ref 82–98)
METHADONE UR QL SCN>300 NG/ML: NEGATIVE
MONOCYTES # BLD AUTO: 0.6 K/UL (ref 0.3–1)
MONOCYTES NFR BLD: 10 % (ref 4–15)
NEUTROPHILS # BLD AUTO: 3.7 K/UL (ref 1.8–7.7)
NEUTROPHILS NFR BLD: 63.8 % (ref 38–73)
NITRITE UR QL STRIP: NEGATIVE
NRBC BLD-RTO: 0 /100 WBC
OPIATES UR QL SCN: NEGATIVE
PCP UR QL SCN>25 NG/ML: NEGATIVE
PH UR STRIP: 6 [PH] (ref 5–8)
PLATELET # BLD AUTO: 323 K/UL (ref 150–450)
PMV BLD AUTO: 10.2 FL (ref 9.2–12.9)
POTASSIUM SERPL-SCNC: 4 MMOL/L (ref 3.5–5.1)
PROT SERPL-MCNC: 8 G/DL (ref 6–8.4)
PROT UR QL STRIP: ABNORMAL
RBC # BLD AUTO: 4.65 M/UL (ref 4.6–6.2)
SODIUM SERPL-SCNC: 139 MMOL/L (ref 136–145)
SP GR UR STRIP: 1.02 (ref 1–1.03)
TOXICOLOGY INFORMATION: NORMAL
TROPONIN I SERPL DL<=0.01 NG/ML-MCNC: <0.006 NG/ML (ref 0–0.03)
URN SPEC COLLECT METH UR: ABNORMAL
UROBILINOGEN UR STRIP-ACNC: NEGATIVE EU/DL
WBC # BLD AUTO: 5.72 K/UL (ref 3.9–12.7)

## 2023-06-12 PROCEDURE — 96361 HYDRATE IV INFUSION ADD-ON: CPT | Mod: ER

## 2023-06-12 PROCEDURE — 63600175 PHARM REV CODE 636 W HCPCS: Mod: ER | Performed by: EMERGENCY MEDICINE

## 2023-06-12 PROCEDURE — 83605 ASSAY OF LACTIC ACID: CPT | Mod: 91,ER | Performed by: EMERGENCY MEDICINE

## 2023-06-12 PROCEDURE — 25500020 PHARM REV CODE 255: Mod: ER | Performed by: EMERGENCY MEDICINE

## 2023-06-12 PROCEDURE — 82550 ASSAY OF CK (CPK): CPT | Mod: ER | Performed by: EMERGENCY MEDICINE

## 2023-06-12 PROCEDURE — 80053 COMPREHEN METABOLIC PANEL: CPT | Mod: ER | Performed by: EMERGENCY MEDICINE

## 2023-06-12 PROCEDURE — 84484 ASSAY OF TROPONIN QUANT: CPT | Mod: ER | Performed by: EMERGENCY MEDICINE

## 2023-06-12 PROCEDURE — 85025 COMPLETE CBC W/AUTO DIFF WBC: CPT | Mod: ER | Performed by: EMERGENCY MEDICINE

## 2023-06-12 PROCEDURE — 83605 ASSAY OF LACTIC ACID: CPT | Mod: ER | Performed by: EMERGENCY MEDICINE

## 2023-06-12 PROCEDURE — 83690 ASSAY OF LIPASE: CPT | Mod: ER | Performed by: EMERGENCY MEDICINE

## 2023-06-12 PROCEDURE — 96360 HYDRATION IV INFUSION INIT: CPT | Mod: ER

## 2023-06-12 PROCEDURE — 25000003 PHARM REV CODE 250: Mod: ER | Performed by: EMERGENCY MEDICINE

## 2023-06-12 RX ADMIN — SODIUM CHLORIDE, POTASSIUM CHLORIDE, SODIUM LACTATE AND CALCIUM CHLORIDE 1000 ML: 600; 310; 30; 20 INJECTION, SOLUTION INTRAVENOUS at 02:06

## 2023-06-12 RX ADMIN — SODIUM CHLORIDE, SODIUM LACTATE, POTASSIUM CHLORIDE, AND CALCIUM CHLORIDE 1000 ML: .6; .31; .03; .02 INJECTION, SOLUTION INTRAVENOUS at 01:06

## 2023-06-12 RX ADMIN — ALUMINUM HYDROXIDE, MAGNESIUM HYDROXIDE, AND DIMETHICONE 30 ML: 200; 20; 200 SUSPENSION ORAL at 12:06

## 2023-06-12 RX ADMIN — LIDOCAINE HYDROCHLORIDE 15 ML: 20 SOLUTION ORAL at 12:06

## 2023-06-12 RX ADMIN — IOHEXOL 100 ML: 350 INJECTION, SOLUTION INTRAVENOUS at 01:06

## 2023-06-12 NOTE — ED PROVIDER NOTES
Encounter Date: 6/11/2023       History     Chief Complaint   Patient presents with    Abdominal Pain     Pt reports a burning sensation in abd area and feels like he is struggling to breathe, no signs of respiratory distress noted in triage      The history is provided by the patient.   Abdominal Pain  The current episode started today. The onset of the illness was gradual. The problem has not changed since onset.The abdominal pain is generalized. The abdominal pain radiates to the chest. The severity of the abdominal pain is 6/10. The abdominal pain is relieved by nothing. The other symptoms of the illness include shortness of breath. The other symptoms of the illness do not include fever, jaundice, melena, nausea, vomiting, diarrhea or dysuria.   The patient has not had a change in bowel habit. Additional symptoms associated with the illness include heartburn. Symptoms associated with the illness do not include back pain.   Review of patient's allergies indicates:  No Known Allergies  Past Medical History:   Diagnosis Date    Back pain      Past Surgical History:   Procedure Laterality Date    KNEE SURGERY Left      Family History   Problem Relation Age of Onset    Hypertension Unknown      Social History     Tobacco Use    Smoking status: Never    Smokeless tobacco: Never   Substance Use Topics    Alcohol use: No    Drug use: No     Review of Systems   Constitutional:  Negative for fever.   HENT:  Negative for congestion.    Eyes:  Negative for photophobia and visual disturbance.   Respiratory:  Positive for shortness of breath. Negative for cough.    Cardiovascular:  Positive for chest pain. Negative for palpitations.   Gastrointestinal:  Positive for abdominal pain and heartburn. Negative for diarrhea, jaundice, melena, nausea and vomiting.   Genitourinary:  Negative for dysuria and flank pain.   Musculoskeletal:  Negative for back pain.   Neurological:  Negative for dizziness and speech difficulty.    Hematological:  Does not bruise/bleed easily.     Physical Exam     Initial Vitals [06/11/23 2254]   BP Pulse Resp Temp SpO2   (!) 153/79 96 20 97.7 °F (36.5 °C) 99 %      MAP       --         Physical Exam    Nursing note and vitals reviewed.  Constitutional: He appears well-developed and well-nourished.   HENT:   Head: Normocephalic and atraumatic.   Mouth/Throat: Oropharynx is clear and moist.   Eyes: Conjunctivae and EOM are normal. Pupils are equal, round, and reactive to light.   Neck: Neck supple.   Normal range of motion.  Cardiovascular:  Normal rate, regular rhythm and normal heart sounds.           Pulmonary/Chest: Breath sounds normal.   Abdominal: Abdomen is soft. Bowel sounds are normal. He exhibits no distension. There is abdominal tenderness in the periumbilical area. There is no rebound and no guarding.   Musculoskeletal:         General: Normal range of motion.      Cervical back: Normal range of motion and neck supple.     Neurological: He is alert and oriented to person, place, and time. He has normal strength.   Skin: Skin is warm and dry.   Psychiatric: He has a normal mood and affect. Thought content normal.       ED Course   Procedures  Labs Reviewed   CBC W/ AUTO DIFFERENTIAL - Abnormal; Notable for the following components:       Result Value    Hemoglobin 13.1 (*)     All other components within normal limits   COMPREHENSIVE METABOLIC PANEL - Abnormal; Notable for the following components:    Glucose 125 (*)     All other components within normal limits   URINALYSIS, REFLEX TO URINE CULTURE - Abnormal; Notable for the following components:    Protein, UA Trace (*)     All other components within normal limits    Narrative:     Specimen Source->Urine   LACTIC ACID, PLASMA - Abnormal; Notable for the following components:    Lactate (Lactic Acid) 2.6 (*)     All other components within normal limits   CK - Abnormal; Notable for the following components:     (*)     All other  components within normal limits   LIPASE   TROPONIN I   DRUG SCREEN PANEL, URINE EMERGENCY   CK   DRUG SCREEN PANEL, URINE EMERGENCY    Narrative:     Specimen Source->Urine   LACTIC ACID, PLASMA     Results for orders placed or performed during the hospital encounter of 06/11/23   CBC W/ AUTO DIFFERENTIAL   Result Value Ref Range    WBC 5.72 3.90 - 12.70 K/uL    RBC 4.65 4.60 - 6.20 M/uL    Hemoglobin 13.1 (L) 14.0 - 18.0 g/dL    Hematocrit 41.0 40.0 - 54.0 %    MCV 88 82 - 98 fL    MCH 28.2 27.0 - 31.0 pg    MCHC 32.0 32.0 - 36.0 g/dL    RDW 14.4 11.5 - 14.5 %    Platelets 323 150 - 450 K/uL    MPV 10.2 9.2 - 12.9 fL    Immature Granulocytes 0.2 0.0 - 0.5 %    Gran # (ANC) 3.7 1.8 - 7.7 K/uL    Immature Grans (Abs) 0.01 0.00 - 0.04 K/uL    Lymph # 1.4 1.0 - 4.8 K/uL    Mono # 0.6 0.3 - 1.0 K/uL    Eos # 0.0 0.0 - 0.5 K/uL    Baso # 0.06 0.00 - 0.20 K/uL    nRBC 0 0 /100 WBC    Gran % 63.8 38.0 - 73.0 %    Lymph % 24.5 18.0 - 48.0 %    Mono % 10.0 4.0 - 15.0 %    Eosinophil % 0.5 0.0 - 8.0 %    Basophil % 1.0 0.0 - 1.9 %    Differential Method Automated    Comp. Metabolic Panel   Result Value Ref Range    Sodium 139 136 - 145 mmol/L    Potassium 4.0 3.5 - 5.1 mmol/L    Chloride 103 95 - 110 mmol/L    CO2 24 23 - 29 mmol/L    Glucose 125 (H) 70 - 110 mg/dL    BUN 16 6 - 20 mg/dL    Creatinine 1.1 0.5 - 1.4 mg/dL    Calcium 9.5 8.7 - 10.5 mg/dL    Total Protein 8.0 6.0 - 8.4 g/dL    Albumin 4.3 3.5 - 5.2 g/dL    Total Bilirubin 0.2 0.1 - 1.0 mg/dL    Alkaline Phosphatase 116 55 - 135 U/L    AST 16 10 - 40 U/L    ALT 31 10 - 44 U/L    Anion Gap 12 8 - 16 mmol/L    eGFR >60.0 >60 mL/min/1.73 m^2   Lipase   Result Value Ref Range    Lipase 23 4 - 60 U/L   Urinalysis, Reflex to Urine Culture Urine, Clean Catch    Specimen: Urine   Result Value Ref Range    Specimen UA Urine, Clean Catch     Color, UA Yellow Yellow, Straw, Marta    Appearance, UA Clear Clear    pH, UA 6.0 5.0 - 8.0    Specific Gravity, UA 1.025 1.005 -  1.030    Protein, UA Trace (A) Negative    Glucose, UA Negative Negative    Ketones, UA Negative Negative    Bilirubin (UA) Negative Negative    Occult Blood UA Negative Negative    Nitrite, UA Negative Negative    Urobilinogen, UA Negative <2.0 EU/dL    Leukocytes, UA Negative Negative   Troponin I   Result Value Ref Range    Troponin I <0.006 0.000 - 0.026 ng/mL   Lactic Acid, Plasma   Result Value Ref Range    Lactate (Lactic Acid) 2.6 (H) 0.5 - 2.2 mmol/L   CK   Result Value Ref Range     (H) 20 - 200 U/L   Drug screen panel, in-house   Result Value Ref Range    Benzodiazepines Negative Negative    Methadone metabolites Negative Negative    Cocaine (Metab.) Negative Negative    Opiate Scrn, Ur Negative Negative    Barbiturate Screen, Ur Negative Negative    Amphetamine Screen, Ur Negative Negative    THC Negative Negative    Phencyclidine Negative Negative    Creatinine, Urine 186.9 23.0 - 375.0 mg/dL    Toxicology Information SEE COMMENT    Lactic Acid, Plasma   Result Value Ref Range    Lactate (Lactic Acid) 2.0 0.5 - 2.2 mmol/L       EKG Readings: (Independently Interpreted)   Initial Reading: No STEMI. Rhythm: Normal Sinus Rhythm. Heart Rate: 99. Ectopy: No Ectopy. ST Segments: Normal ST Segments. T Waves: Normal. Axis: Normal. Clinical Impression: Normal Sinus Rhythm   ECG Results              EKG 12-lead (In process)  Result time 06/11/23 23:53:29      In process by Interface, Lab In [facility] (06/11/23 23:53:29)                   Narrative:    Test Reason : R10.9,    Vent. Rate : 099 BPM     Atrial Rate : 099 BPM     P-R Int : 142 ms          QRS Dur : 084 ms      QT Int : 368 ms       P-R-T Axes : 068 029 033 degrees     QTc Int : 472 ms    Normal sinus rhythm  Normal ECG  When compared with ECG of 07-AUG-2022 13:04,  No significant change was found    Referred By: [email or web address]   SELF           Confirmed By:                                   Imaging Results              CT Abdomen Pelvis With Contrast  (In process)                      X-Ray Chest 1 View (Preliminary result)  Result time 06/11/23 23:59:11      Wet Read by Lencho Dueñas MD (06/11/23 23:59:11, [facility] - Emergency Dept, Emergency Medicine)    naf                                CT NAF per statrad     Medications   aluminum-magnesium hydroxide-simethicone 200-200-20 mg/5 mL suspension 30 mL (30 mLs Oral Given 6/12/23 0002)     And   LIDOcaine HCl 2% oral solution 15 mL (15 mLs Oral Given 6/12/23 0002)   iohexoL (OMNIPAQUE 350) injection 100 mL (100 mLs Intravenous Given 6/12/23 0108)   lactated ringers bolus 1,000 mL (0 mLs Intravenous Stopped 6/12/23 0200)   lactated ringers bolus 1,000 mL (1,000 mLs Intravenous New Bag 6/12/23 0221)     Medical Decision Making:   Initial Assessment:   Burning type abd pain radiates to chest  Differential Diagnosis:   Gerd, ACS, Pancreatitis, Gastritis, SBO  Independently Interpreted Test(s):   I have ordered and independently interpreted X-rays - see prior notes.  I have ordered and independently interpreted EKG Reading(s) - see prior notes  Clinical Tests:   Lab Tests: Ordered and Reviewed  The following lab test(s) were unremarkable: CBC, CMP, Urinalysis, Troponin, Lipase and Lactate       <> Summary of Lab: Initial LA 2.6, repeat after hydration 2.0  Radiological Study: Ordered and Reviewed  Medical Tests: Ordered and Reviewed  ED Management:  I discussed with patient and/or family/caretaker that evaluation in the ED does not suggest any emergent or life threatening medical conditions requiring immediate intervention beyond what was provided in the ED, and I believe patient is safe for discharge.  Regardless, an unremarkable evaluation in the ED does not preclude the development or presence of a serious of life threatening condition. As such, patient was instructed to return immediately for any worsening or change in current symptoms.  2 liters LR infused, GI cocktail. Pain resolved. Pt in NAD. Repeat  exam benign.                        Clinical Impression:   Final diagnoses:  [R10.9] Abdominal pain (Primary)  [R06.02] SOB (shortness of breath)        ED Disposition Condition    Discharge Stable          ED Prescriptions    None       Follow-up Information       Follow up With Specialties Details Why Contact Info    Anam Andrews MD Family Medicine Call in 2 days  [address] [address]  [phone]      [facility] - Emergency Dept Emergency Medicine  If symptoms worsen [id number] [address] [id number] [phone]             Lencho Dueñas MD  06/12/23 033

## 2024-06-07 NOTE — PLAN OF CARE
Care plan reviewed with patient. Able to get out of bed and walk to the bathroom unassisted. Free from falls. No other complaints made.     [Cervical Pap Smear] : cervical Pap smear [Liquid Base] : liquid base [GC & Chlamydia via Pap] : GC & Chlamydia via Pap [Tolerated Well] : the patient tolerated the procedure well [No Complications] : there were no complications

## 2024-11-30 ENCOUNTER — HOSPITAL ENCOUNTER (EMERGENCY)
Facility: HOSPITAL | Age: 59
Discharge: HOME | End: 2024-11-30
Attending: EMERGENCY MEDICINE
Payer: COMMERCIAL

## 2024-11-30 VITALS
SYSTOLIC BLOOD PRESSURE: 148 MMHG | HEART RATE: 87 BPM | RESPIRATION RATE: 16 BRPM | DIASTOLIC BLOOD PRESSURE: 81 MMHG | TEMPERATURE: 98 F | OXYGEN SATURATION: 98 % | BODY MASS INDEX: 32.82 KG/M2 | WEIGHT: 269.5 LBS | HEIGHT: 76 IN

## 2024-11-30 DIAGNOSIS — M17.11 PRIMARY OSTEOARTHRITIS OF RIGHT KNEE: Primary | ICD-10-CM

## 2024-11-30 DIAGNOSIS — M79.606 LEG PAIN: ICD-10-CM

## 2024-11-30 PROCEDURE — 99283 EMERGENCY DEPT VISIT LOW MDM: CPT | Mod: 25,ER

## 2024-11-30 NOTE — ED PROVIDER NOTES
Encounter Date: 11/30/2024       History     Chief Complaint   Patient presents with    Leg Injury     Fell a few days ago onto ground on  right side, had keys in his pocket. Pain all the way down right leg now. Had tripped over something.      The history is provided by the patient.   Fall  The accident occurred two days ago. The fall occurred while walking. Pain location: right leg. Pertinent negatives include no fever, no numbness, no bowel incontinence, no nausea, no vomiting, no headaches, no hearing loss and no loss of consciousness.     Review of patient's allergies indicates:  No Known Allergies  Past Medical History:   Diagnosis Date    Back pain      Past Surgical History:   Procedure Laterality Date    KNEE SURGERY Left      Family History   Problem Relation Name Age of Onset    Hypertension Unknown       Social History     Tobacco Use    Smoking status: Never    Smokeless tobacco: Never   Substance Use Topics    Alcohol use: No    Drug use: No     Review of Systems   Constitutional:  Negative for chills, fatigue and fever.   HENT:  Negative for congestion.    Respiratory:  Negative for cough and shortness of breath.    Gastrointestinal:  Negative for bowel incontinence, diarrhea, nausea and vomiting.   Genitourinary:  Negative for dysuria.   Neurological:  Negative for loss of consciousness, weakness, numbness and headaches.       Physical Exam     Initial Vitals [11/30/24 1043]   BP Pulse Resp Temp SpO2   (!) 148/81 87 16 97.8 °F (36.6 °C) 98 %      MAP       --         Physical Exam    Constitutional: He appears well-developed and well-nourished. No distress.   HENT:   Head: Normocephalic and atraumatic.   Eyes: Conjunctivae are normal. Pupils are equal, round, and reactive to light.   Neck: Neck supple.   Normal range of motion.  Cardiovascular:  Normal rate, regular rhythm and normal heart sounds.           Pulmonary/Chest: Breath sounds normal.   Abdominal: Abdomen is soft. Bowel sounds are normal.    Musculoskeletal:         General: Normal range of motion.      Cervical back: Normal range of motion and neck supple.     Neurological: He is alert and oriented to person, place, and time. No cranial nerve deficit.   Skin: Skin is warm and dry.   Psychiatric: He has a normal mood and affect.         ED Course   Procedures  Labs Reviewed - No data to display       Imaging Results              X-Ray Femur Ap/Lat Right (Final result)  Result time 11/30/24 11:54:24      Final result by Yoshi Fernandez MD (11/30/24 11:54:24)                   Impression:      See above      Electronically signed by: Yoshi Fernandez MD  Date:    11/30/2024  Time:    11:54               Narrative:    EXAMINATION:  XR FEMUR 2 VIEW RIGHT    CLINICAL HISTORY:  Pain in leg, unspecified    TECHNIQUE:  AP and lateral views of the right femur were performed.    COMPARISON:  None    FINDINGS:  AP and lateral views of the right femur show no fracture or dislocation.  Mild osteoarthritis of the right hip joint.  Osteoarthritis of the right knee involving all 3 compartments.                                       Medications - No data to display  Medical Decision Making  DDx: fall, leg pain, arthritis    Amount and/or Complexity of Data Reviewed  Radiology: ordered.                                      Clinical Impression:  Final diagnoses:  [M79.606] Leg pain  [M17.11] Primary osteoarthritis of right knee (Primary)          ED Disposition Condition    Discharge Stable          ED Prescriptions    None       Follow-up Information       Follow up With Specialties Details Why Contact Info    Anam Andrews MD Family Medicine   [address] [place] [address]  [phone]               Omar Garcia MD  11/30/24 [id number]